# Patient Record
Sex: FEMALE | Race: WHITE | NOT HISPANIC OR LATINO | Employment: STUDENT | URBAN - METROPOLITAN AREA
[De-identification: names, ages, dates, MRNs, and addresses within clinical notes are randomized per-mention and may not be internally consistent; named-entity substitution may affect disease eponyms.]

---
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
## 2021-04-15 ENCOUNTER — PATIENT MESSAGE (OUTPATIENT)
Dept: RESEARCH | Facility: HOSPITAL | Age: 21
End: 2021-04-15

## 2021-06-07 RX ORDER — FLUOXETINE HYDROCHLORIDE 20 MG/1
CAPSULE ORAL
Qty: 30 CAPSULE | Refills: 0 | Status: SHIPPED | OUTPATIENT
Start: 2021-06-07 | End: 2021-07-12

## 2021-06-07 RX ORDER — FLUOXETINE HYDROCHLORIDE 40 MG/1
CAPSULE ORAL
Qty: 30 CAPSULE | Refills: 0 | Status: SHIPPED | OUTPATIENT
Start: 2021-06-07 | End: 2021-07-12 | Stop reason: SDUPTHER

## 2021-07-12 ENCOUNTER — OFFICE VISIT (OUTPATIENT)
Dept: PSYCHIATRY | Facility: CLINIC | Age: 21
End: 2021-07-12
Payer: MEDICAID

## 2021-07-12 DIAGNOSIS — F41.0 PANIC DISORDER WITHOUT AGORAPHOBIA WITH MODERATE PANIC ATTACKS: ICD-10-CM

## 2021-07-12 DIAGNOSIS — F41.1 GAD (GENERALIZED ANXIETY DISORDER): ICD-10-CM

## 2021-07-12 DIAGNOSIS — F40.10 SOCIAL ANXIETY DISORDER: ICD-10-CM

## 2021-07-12 DIAGNOSIS — F33.41 RECURRENT MAJOR DEPRESSION IN PARTIAL REMISSION: Primary | ICD-10-CM

## 2021-07-12 PROCEDURE — 99213 PR OFFICE/OUTPT VISIT, EST, LEVL III, 20-29 MIN: ICD-10-PCS | Mod: 95,AF,HA, | Performed by: PSYCHIATRY & NEUROLOGY

## 2021-07-12 PROCEDURE — 99213 OFFICE O/P EST LOW 20 MIN: CPT | Mod: 95,AF,HA, | Performed by: PSYCHIATRY & NEUROLOGY

## 2021-07-12 RX ORDER — FLUOXETINE HYDROCHLORIDE 40 MG/1
40 CAPSULE ORAL DAILY
Qty: 90 CAPSULE | Refills: 0 | Status: SHIPPED | OUTPATIENT
Start: 2021-07-12 | End: 2021-10-11 | Stop reason: SDUPTHER

## 2021-07-12 RX ORDER — FLUOXETINE HYDROCHLORIDE 20 MG/1
CAPSULE ORAL
Qty: 90 CAPSULE | Refills: 0 | Status: CANCELLED | OUTPATIENT
Start: 2021-07-12

## 2021-09-24 ENCOUNTER — OFFICE VISIT (OUTPATIENT)
Dept: PSYCHIATRY | Facility: CLINIC | Age: 21
End: 2021-09-24
Payer: MEDICAID

## 2021-09-24 DIAGNOSIS — F33.41 RECURRENT MAJOR DEPRESSION IN PARTIAL REMISSION: Primary | ICD-10-CM

## 2021-09-24 DIAGNOSIS — F40.10 SOCIAL ANXIETY DISORDER: ICD-10-CM

## 2021-09-24 DIAGNOSIS — F41.1 GAD (GENERALIZED ANXIETY DISORDER): ICD-10-CM

## 2021-09-24 DIAGNOSIS — F41.0 PANIC DISORDER WITHOUT AGORAPHOBIA WITH MODERATE PANIC ATTACKS: ICD-10-CM

## 2021-09-24 DIAGNOSIS — T88.7XXA SIDE EFFECT OF MEDICATION: ICD-10-CM

## 2021-09-24 PROCEDURE — 99214 OFFICE O/P EST MOD 30 MIN: CPT | Mod: 95,AF,HA, | Performed by: PSYCHIATRY & NEUROLOGY

## 2021-09-24 PROCEDURE — 99214 PR OFFICE/OUTPT VISIT, EST, LEVL IV, 30-39 MIN: ICD-10-PCS | Mod: 95,AF,HA, | Performed by: PSYCHIATRY & NEUROLOGY

## 2021-09-24 RX ORDER — FLUOXETINE HYDROCHLORIDE 40 MG/1
40 CAPSULE ORAL DAILY
Qty: 90 CAPSULE | Refills: 0 | Status: CANCELLED | OUTPATIENT
Start: 2021-09-24 | End: 2021-12-23

## 2021-09-24 RX ORDER — FLUOXETINE HYDROCHLORIDE 20 MG/1
20 CAPSULE ORAL DAILY
Qty: 30 CAPSULE | Refills: 1 | Status: SHIPPED | OUTPATIENT
Start: 2021-09-24 | End: 2021-11-23

## 2022-02-25 NOTE — PROGRESS NOTES
"Outpatient Psychiatry Follow-Up Visit (MD/NP)    4/23/2019    Clinical Status of Patient:  Outpatient (Ambulatory)  IDENTIFYING DATA:  Child's Name: Patricia Guzman  Grade:12 th will be in academic year 2018-19  School: [employer]  Lives with: father, 1 brother who recently graduated from college      Chief Complaint: Patricia Guzman is a 19 y.o. female who presents today for follow-up of depression and anxiety. Met with patient and her mother, Ira Guzman.    "I have only 4 days left of classes. I have no exams to take."    Interval History and Content of Current Session:  Interim Events/Subjective Report/Content of Current Session:     Mraina is a former patient of Dr. Mota and per chart review she is treated for anxiety, depression and panic attacks and has been non compliant with appointments and medications due to financial issues and not having health insurance. She was last seen 7/9/2018 by Dr. Mota. She presented to me on 12/12/2018 and was lost to follow up until today 4/23/2019.    "I am going [employer] and I will graduate with no dept. 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  I just have not sat down for the class."    "My parents are back together it has been OK but not really and I will be out in a few months."    Medical history: abdominal migraines    No surgical history    Current medications:    Vistaril  OCP  Hyoscyamine    Psychotherapy:  · Target symptoms: depression, anxiety   · Why chosen therapy is appropriate versus another modality: relevant to " diagnosis, patient responds to this modality, evidence based practice  · Outcome monitoring methods: self-report, observation  · Therapeutic intervention type: insight oriented psychotherapy, behavior modifying psychotherapy, supportive psychotherapy  · Topics discussed/themes: relationships difficulties, work stress, difficulty managing affect in interpersonal relationships, building skills sets for symptom management  · The patient's response to the intervention is accepting. The patient's progress toward treatment goals is good.   · Duration of intervention: 16 minutes.    Review of Systems   · PSYCHIATRIC: Pertinent items are noted in the narrative.  · CONSTITUTIONAL: No weight gain or loss.   · MUSCULOSKELETAL: No pain or stiffness of the joints.  · NEUROLOGIC: No weakness, sensory changes, seizures, confusion, memory loss, tremor or other abnormal movements.  · CARDIOVASCULAR: No tachycardia or chest pain.  · GASTROINTESTINAL:  No nausea, vomiting, pain, constipation or diarrhea. Patient has a history of abdominal migraines at least once per day.     Past Medical, Family and Social History: The patient's past medical, family and social history have been reviewed and updated as appropriate within the electronic medical record - see encounter notes. Parents recently  and they will be moving out of the family home.     Compliance: yes     Side effects: None     Risk Parameters:  Patient reports no suicidal ideation  Patient reports no homicidal ideation  Patient reports no self-injurious behavior  Patient reports no violent behavior    Wt Readings from Last 3 Encounters:   04/23/19 55.7 kg (122 lb 12.7 oz) (48 %, Z= -0.06)*   12/12/18 54.4 kg (119 lb 14.9 oz) (43 %, Z= -0.17)*   10/13/18 55.3 kg (122 lb) (48 %, Z= -0.04)*     * Growth percentiles are based on CDC (Girls, 2-20 Years) data.     Temp Readings from Last 3 Encounters:   10/13/18 97.9 °F (36.6 °C) (Oral)   05/03/18 98.3 °F (36.8 °C)   11/02/16  97.6 °F (36.4 °C) (Tympanic)     BP Readings from Last 3 Encounters:   04/23/19 114/64   12/12/18 110/67 (52 %, Z = 0.05 /  61 %, Z = 0.28)*   10/13/18 115/60 (67 %, Z = 0.45 /  24 %, Z = -0.72)*     *BP percentiles are based on the August 2017 AAP Clinical Practice Guideline for girls     Pulse Readings from Last 3 Encounters:   04/23/19 87   12/12/18 78   10/13/18 78     Weight is stable        Exam (detailed: at least 9 elements; comprehensive: all 15 elements)   Constitutional  Vitals:  Most recent vital signs, dated today, were reviewed.        General:  unremarkable, age appropriate, casually dressed, pleasant and cooperative and funny and easy to engage     Musculoskeletal  Muscle Strength/Tone:  no dyskinesia, no tremor, no tic   Gait & Station:  non-ataxic      Psychiatric  Speech:  no latency; no press, spontaneous   Mood & Affect:  euthymic  congruent and appropriate   Thought Process:  normal and logical   Associations:  intact   Thought Content:  normal, no suicidality, no homicidality, delusions, or paranoia   Insight:  intact   Judgement: behavior is adequate to circumstances   Orientation:  Alert and oriented to person place time date and situation   Memory: intact for content of interview   Language: Able to name and repeat   Attention Span & Concentration:  able to focus, completed tasks   Fund of Knowledge:  intact and appropriate to age and level of education      Assessment and Diagnosis   Status/Progress: Based on the examination today, the patient's problem(s) is/are inadequately controlled due to non compliance with appointments as they have no health insurance. New problems have not been presented today.  Diagnostic uncertainty and Lack of compliance are not complicating management of the primary condition. There are no active rule-out diagnoses for this patient at this time.      General Impression: 19 year old female with anxiety and depression improved significantly on Prozac 60 mg  daily.       ICD-10-CM ICD-9-CM   1. Major depressive disorder, recurrent episode, moderate F33.1 296.32   2. SANDHYA (generalized anxiety disorder) F41.1 300.02   3. Social anxiety disorder F40.10 300.23   4. Panic disorder without agoraphobia with moderate panic attacks F41.0 300.01       Intervention/Counseling/Treatment Plan   · Medication Management: Continue Prozac to 60 mg daily. The risks and benefits of medication were discussed with the patient.  · Counseling provided with patient and caregiver as follows: importance of compliance with chosen treatment options was emphasized, risks and benefits of treatment options, including medications, were discussed with the patient    Return to Clinic: 3 months     n/v , decreased PO intake

## 2022-03-18 DIAGNOSIS — F33.41 RECURRENT MAJOR DEPRESSION IN PARTIAL REMISSION: ICD-10-CM

## 2022-03-18 RX ORDER — FLUOXETINE HYDROCHLORIDE 40 MG/1
40 CAPSULE ORAL DAILY
Qty: 90 CAPSULE | Refills: 0 | Status: SHIPPED | OUTPATIENT
Start: 2022-03-18 | End: 2022-04-26 | Stop reason: SDUPTHER
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
## 2022-04-26 ENCOUNTER — OFFICE VISIT (OUTPATIENT)
Dept: PSYCHIATRY | Facility: CLINIC | Age: 22
End: 2022-04-26
Payer: MEDICAID

## 2022-04-26 DIAGNOSIS — F41.1 GAD (GENERALIZED ANXIETY DISORDER): Primary | ICD-10-CM

## 2022-04-26 DIAGNOSIS — F41.0 PANIC DISORDER WITHOUT AGORAPHOBIA WITH MODERATE PANIC ATTACKS: ICD-10-CM

## 2022-04-26 DIAGNOSIS — T88.7XXA SIDE EFFECT OF MEDICATION: ICD-10-CM

## 2022-04-26 DIAGNOSIS — F40.10 SOCIAL ANXIETY DISORDER: ICD-10-CM

## 2022-04-26 DIAGNOSIS — F33.41 RECURRENT MAJOR DEPRESSION IN PARTIAL REMISSION: ICD-10-CM

## 2022-04-26 PROCEDURE — 1160F RVW MEDS BY RX/DR IN RCRD: CPT | Mod: AF,HB,CPTII,95 | Performed by: PSYCHIATRY & NEUROLOGY

## 2022-04-26 PROCEDURE — 1159F PR MEDICATION LIST DOCUMENTED IN MEDICAL RECORD: ICD-10-PCS | Mod: AF,HB,CPTII,95 | Performed by: PSYCHIATRY & NEUROLOGY

## 2022-04-26 PROCEDURE — 1160F PR REVIEW ALL MEDS BY PRESCRIBER/CLIN PHARMACIST DOCUMENTED: ICD-10-PCS | Mod: AF,HB,CPTII,95 | Performed by: PSYCHIATRY & NEUROLOGY

## 2022-04-26 PROCEDURE — 1159F MED LIST DOCD IN RCRD: CPT | Mod: AF,HB,CPTII,95 | Performed by: PSYCHIATRY & NEUROLOGY

## 2022-04-26 PROCEDURE — 99214 OFFICE O/P EST MOD 30 MIN: CPT | Mod: AF,HB,95, | Performed by: PSYCHIATRY & NEUROLOGY

## 2022-04-26 PROCEDURE — 99214 PR OFFICE/OUTPT VISIT, EST, LEVL IV, 30-39 MIN: ICD-10-PCS | Mod: AF,HB,95, | Performed by: PSYCHIATRY & NEUROLOGY

## 2022-04-26 RX ORDER — FLUOXETINE HYDROCHLORIDE 40 MG/1
40 CAPSULE ORAL DAILY
Qty: 90 CAPSULE | Refills: 0 | Status: SHIPPED | OUTPATIENT
Start: 2022-04-26 | End: 2022-07-26 | Stop reason: SDUPTHER

## 2022-04-26 RX ORDER — BUPROPION HYDROCHLORIDE 150 MG/1
150 TABLET ORAL DAILY
Qty: 90 TABLET | Refills: 0 | Status: SHIPPED | OUTPATIENT
Start: 2022-04-26 | End: 2022-09-06

## 2022-04-27 DIAGNOSIS — G43.009 MIGRAINE WITHOUT AURA AND WITHOUT STATUS MIGRAINOSUS, NOT INTRACTABLE: Primary | ICD-10-CM

## 2022-06-19 ENCOUNTER — PATIENT MESSAGE (OUTPATIENT)
Dept: PSYCHIATRY | Facility: CLINIC | Age: 22
End: 2022-06-19
Payer: MEDICAID

## 2022-07-05 ENCOUNTER — PATIENT MESSAGE (OUTPATIENT)
Dept: PSYCHIATRY | Facility: CLINIC | Age: 22
End: 2022-07-05
Payer: MEDICAID

## 2022-07-21 ENCOUNTER — PATIENT MESSAGE (OUTPATIENT)
Dept: PSYCHIATRY | Facility: CLINIC | Age: 22
End: 2022-07-21
Payer: MEDICAID

## 2022-07-26 DIAGNOSIS — F33.41 RECURRENT MAJOR DEPRESSION IN PARTIAL REMISSION: ICD-10-CM

## 2022-07-26 RX ORDER — FLUOXETINE HYDROCHLORIDE 40 MG/1
40 CAPSULE ORAL DAILY
Qty: 90 CAPSULE | Refills: 0 | Status: SHIPPED | OUTPATIENT
Start: 2022-07-26 | End: 2022-08-23 | Stop reason: SDUPTHER

## 2022-08-23 ENCOUNTER — OFFICE VISIT (OUTPATIENT)
Dept: PSYCHIATRY | Facility: CLINIC | Age: 22
End: 2022-08-23
Payer: MEDICAID

## 2022-08-23 DIAGNOSIS — F33.41 RECURRENT MAJOR DEPRESSION IN PARTIAL REMISSION: Primary | ICD-10-CM

## 2022-08-23 DIAGNOSIS — F40.10 SOCIAL ANXIETY DISORDER: ICD-10-CM

## 2022-08-23 DIAGNOSIS — F41.1 GAD (GENERALIZED ANXIETY DISORDER): ICD-10-CM

## 2022-08-23 DIAGNOSIS — F41.0 PANIC DISORDER WITHOUT AGORAPHOBIA WITH MODERATE PANIC ATTACKS: ICD-10-CM

## 2022-08-23 DIAGNOSIS — T88.7XXA SIDE EFFECT OF MEDICATION: ICD-10-CM

## 2022-08-23 PROCEDURE — 1160F PR REVIEW ALL MEDS BY PRESCRIBER/CLIN PHARMACIST DOCUMENTED: ICD-10-PCS | Mod: CPTII,95,AF,HB | Performed by: PSYCHIATRY & NEUROLOGY

## 2022-08-23 PROCEDURE — 1160F RVW MEDS BY RX/DR IN RCRD: CPT | Mod: CPTII,95,AF,HB | Performed by: PSYCHIATRY & NEUROLOGY

## 2022-08-23 PROCEDURE — 99214 PR OFFICE/OUTPT VISIT, EST, LEVL IV, 30-39 MIN: ICD-10-PCS | Mod: 95,AF,HB, | Performed by: PSYCHIATRY & NEUROLOGY

## 2022-08-23 PROCEDURE — 99214 OFFICE O/P EST MOD 30 MIN: CPT | Mod: 95,AF,HB, | Performed by: PSYCHIATRY & NEUROLOGY

## 2022-08-23 PROCEDURE — 1159F PR MEDICATION LIST DOCUMENTED IN MEDICAL RECORD: ICD-10-PCS | Mod: CPTII,95,AF,HB | Performed by: PSYCHIATRY & NEUROLOGY

## 2022-08-23 PROCEDURE — 1159F MED LIST DOCD IN RCRD: CPT | Mod: CPTII,95,AF,HB | Performed by: PSYCHIATRY & NEUROLOGY

## 2022-08-23 RX ORDER — BUPROPION HYDROCHLORIDE 150 MG/1
150 TABLET ORAL DAILY
Qty: 90 TABLET | Refills: 0 | Status: CANCELLED | OUTPATIENT
Start: 2022-08-23 | End: 2022-11-21

## 2022-08-23 RX ORDER — FLUOXETINE HYDROCHLORIDE 40 MG/1
40 CAPSULE ORAL DAILY
Qty: 90 CAPSULE | Refills: 0 | Status: SHIPPED | OUTPATIENT
Start: 2022-08-23 | End: 2023-01-12
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
## 2022-09-02 RX ORDER — SUMATRIPTAN 50 MG/1
TABLET, FILM COATED ORAL
COMMUNITY
Start: 2022-07-28 | End: 2022-09-06 | Stop reason: SDUPTHER

## 2022-09-02 NOTE — PROGRESS NOTES
[facility] Neurology initial Office Visit Note    Initial Visit Date:  September 6, 2022  Current Visit Date:  09/06/2022    Chief Complaint:     Chief Complaint   Patient presents with    Headache     States migraines are daily       History of Present Illness:      This is 22 y.o. female with history of depression, who is referred headache disorder.    Age of Onset : elementary school    Headache Description: left retro-orbital sharp pain, severe, lasting 3 days, with nausea, with photophobia and phonophobia, with OU with loss of vision, impeding day to day activity.     Frequency: 20 migraine headache days per month for the past 2 years     Provocation Factors: stress, sleep deprivation     Risk Factors  - Family history of headache disorder: Yes mom with migraine disorder .  - History of focal CNS lesions: No  - History of CNS infections: No  - History head trauma: No  - History of underlying mood disorder: Yes depression, anxiety.   - History of sleep disorder: No  - Recreational drug use: No  - Tobacco use: No  - Alcohol use: Yes occasional.   - Weight fluctuation: No  - Isotretinoin or Tetracycline use:  No  - Family planning and contraceptive use: No. Not on contraceptives.     Medications:     Current Prophylactic  Fluoxetine 40 mg once a day     Current Abortive  Sumatriptan 50 mg twice a day as needed - partially effective   Aleve - < 1 per week. Ineffecitve     Prior Prophylactic  Propanolol 10 mg (February 21, 2020) - worsening headache   Wellbutrin - too expensive.     Prior Abortive  Denied     Devices:     - VNS:  - TNS  - TMS:     Procedures:     - Botox:  - PSG block:   - Occipital nerve block:     Labs:     Results for orders placed or performed during the hospital encounter of 10/13/18   POCT urine pregnancy   Result Value Ref Range    POC Preg Test, Ur Negative Negative     Acceptable Yes        Studies:     - MRI Brain:   - MRA Head w/o Tramaine:   - MRV Head w/o Tramaine:   - NCHCT:  - Lumbar  Puncture:    Review of Systems:     Review of Systems   All other systems reviewed and are negative.    Physical Exams:     Vitals:    09/06/22 0856   BP: 116/77   Pulse: 96   Resp: 18   Temp: 97.9 °F (36.6 °C)       Physical Exam  Vitals and nursing note reviewed.   Constitutional:       Appearance: Normal appearance.   HENT:      Head: Normocephalic and atraumatic.      Nose: Nose normal.      Mouth/Throat:      Mouth: Mucous membranes are moist.      Pharynx: Oropharynx is clear.   Eyes:      Conjunctiva/sclera: Conjunctivae normal.   Cardiovascular:      Rate and Rhythm: Normal rate and regular rhythm.      Pulses: Normal pulses.   Pulmonary:      Effort: Pulmonary effort is normal.      Breath sounds: Normal breath sounds.   Abdominal:      General: Abdomen is flat.   Musculoskeletal:         General: Normal range of motion.      Cervical back: Normal range of motion.   Skin:     General: Skin is warm.   Neurological:      Mental Status: She is alert.       Comprehensive Neurological Exam:  Mental Status: Alert Oriented to Self, Date, and Place. Naming, repetition, reading, and writing wnl. Comprehension wnl. No dysarthria.   CN II - XII: GUERO, No APD, Fundus wnl OU, VA grossly intact to finger counting at 6 ft, VFFC, No ptosis OU, EOMI without nystagmus LT/Temp symmetric in CN V1-3 distribution, Hearing grossly intact, Face Symmetric, Tongue and Uvula midline, Trapezius symmetric bilateral.   Motor: tone and bulk wnl throughout, no abnormal involuntary or voluntary movements, 5/5 to confrontation, Fine finger movements wnl b/l, No pronator drift.   Sensory: LT, Proprioception, Vibration, PP, Temp symmetric. No sensory simultagnosia.   Reflexes: 2+ throughout, plantar reflexes downward bilateral.   Cerebellar: FNF wnl b/l, RAHM wnl b/l  Romberg: Negative  Gait: normal. Heel Gait, Toe Gait, Tandem Gait wnl.     Assessment:     This is 22 y.o. female with history of depression, who is referred for chronic  migraine without aura, not intractable. Patient not intending on any form of family planning at this point in time. She is made aware of the teratogenicity of Topiramate.     Problem List Items Addressed This Visit          Psychiatric    Depression, major, recurrent, in partial remission     Other Visit Diagnoses       Migraine without aura and without status migrainosus, not intractable    -  Primary            Plan:     [] increase Sumatriptan to 100 mg twice a day as needed   [] start TPM 50 mg at bedtime    RTC 3 months    Headache education provided: good sleep hygiene and 7 hours of sleep per night, stress management, medication overuse education provided. Using more 3 OTC per week may worsen headaches, high intensity interval training has shown to reduce headache frequency. Low carb, high protein has shown to reduce headache frequency. Patient is instructed in keep headache diary.     I have explained the treatment plan, diagnosis, and prognosis to patient. All questions are answered to the best of my knowledge.     Face to face time 45 minutes, including documentation, chart review, counseling, education, review of test results, relevant medical records, and coordination of care.       Candace Jacobo MD   General Neurology  09/06/2022

## 2022-09-06 ENCOUNTER — OFFICE VISIT (OUTPATIENT)
Dept: NEUROLOGY | Facility: CLINIC | Age: 22
End: 2022-09-06
Payer: MEDICAID

## 2022-09-06 VITALS
WEIGHT: 149.94 LBS | TEMPERATURE: 98 F | RESPIRATION RATE: 18 BRPM | OXYGEN SATURATION: 99 % | HEIGHT: 62 IN | DIASTOLIC BLOOD PRESSURE: 77 MMHG | BODY MASS INDEX: 27.59 KG/M2 | HEART RATE: 96 BPM | SYSTOLIC BLOOD PRESSURE: 116 MMHG

## 2022-09-06 DIAGNOSIS — F33.41 DEPRESSION, MAJOR, RECURRENT, IN PARTIAL REMISSION: ICD-10-CM

## 2022-09-06 DIAGNOSIS — G43.001 MIGRAINE WITHOUT AURA AND WITH STATUS MIGRAINOSUS, NOT INTRACTABLE: Primary | ICD-10-CM

## 2022-09-06 PROBLEM — G43.009 MIGRAINE WITHOUT AURA AND WITHOUT STATUS MIGRAINOSUS, NOT INTRACTABLE: Status: ACTIVE | Noted: 2022-09-06

## 2022-09-06 PROCEDURE — 1159F PR MEDICATION LIST DOCUMENTED IN MEDICAL RECORD: ICD-10-PCS | Mod: CPTII,,, | Performed by: PSYCHIATRY & NEUROLOGY

## 2022-09-06 PROCEDURE — 3074F SYST BP LT 130 MM HG: CPT | Mod: CPTII,,, | Performed by: PSYCHIATRY & NEUROLOGY

## 2022-09-06 PROCEDURE — 3078F DIAST BP <80 MM HG: CPT | Mod: CPTII,,, | Performed by: PSYCHIATRY & NEUROLOGY

## 2022-09-06 PROCEDURE — 3008F BODY MASS INDEX DOCD: CPT | Mod: CPTII,,, | Performed by: PSYCHIATRY & NEUROLOGY

## 2022-09-06 PROCEDURE — 99214 OFFICE O/P EST MOD 30 MIN: CPT | Mod: PBBFAC | Performed by: PSYCHIATRY & NEUROLOGY

## 2022-09-06 PROCEDURE — 1159F MED LIST DOCD IN RCRD: CPT | Mod: CPTII,,, | Performed by: PSYCHIATRY & NEUROLOGY

## 2022-09-06 PROCEDURE — 3074F PR MOST RECENT SYSTOLIC BLOOD PRESSURE < 130 MM HG: ICD-10-PCS | Mod: CPTII,,, | Performed by: PSYCHIATRY & NEUROLOGY

## 2022-09-06 PROCEDURE — 99204 OFFICE O/P NEW MOD 45 MIN: CPT | Mod: S$PBB,,, | Performed by: PSYCHIATRY & NEUROLOGY

## 2022-09-06 PROCEDURE — 3008F PR BODY MASS INDEX (BMI) DOCUMENTED: ICD-10-PCS | Mod: CPTII,,, | Performed by: PSYCHIATRY & NEUROLOGY

## 2022-09-06 PROCEDURE — 3078F PR MOST RECENT DIASTOLIC BLOOD PRESSURE < 80 MM HG: ICD-10-PCS | Mod: CPTII,,, | Performed by: PSYCHIATRY & NEUROLOGY

## 2022-09-06 PROCEDURE — 99204 PR OFFICE/OUTPT VISIT, NEW, LEVL IV, 45-59 MIN: ICD-10-PCS | Mod: S$PBB,,, | Performed by: PSYCHIATRY & NEUROLOGY

## 2022-09-06 RX ORDER — ONDANSETRON 8 MG/1
8 TABLET, ORALLY DISINTEGRATING ORAL 2 TIMES DAILY
COMMUNITY
End: 2022-12-07 | Stop reason: SDUPTHER

## 2022-09-06 RX ORDER — SUMATRIPTAN SUCCINATE 100 MG/1
100 TABLET ORAL 2 TIMES DAILY PRN
Qty: 9 TABLET | Refills: 4 | Status: SHIPPED | OUTPATIENT
Start: 2022-09-06 | End: 2022-12-07 | Stop reason: SDUPTHER

## 2022-09-06 RX ORDER — TOPIRAMATE 50 MG/1
50 TABLET, FILM COATED ORAL NIGHTLY
Qty: 30 TABLET | Refills: 4 | Status: SHIPPED | OUTPATIENT
Start: 2022-09-06 | End: 2022-12-05

## 2022-10-30 NOTE — PROGRESS NOTES
"Outpatient Psychiatry Follow-Up Visit (MD/NP)    10/31/2022    Last appointment:8/23/2022    Missed appointment:  June 21, 2021 July 2, 2021    Clinical Status of Patient:  Outpatient (Ambulatory)    IDENTIFYING DATA:    Child's Name: Patricia Dillon"  Grade: 4th year in college 2022-23 and will graduate after 2023-24 academic year majoring in health information management  School: [employer]  Lives with:(now in an apartment)     The patient location is: [place]  The chief complaint leading to consultation is: anxiety, new problem of inattention    Visit type: audiovisual    Face to Face time with patient: 30 minutes  30 minutes of total time spent on the encounter, which includes face to face time and non-face to face time preparing to see the patient (e.g., review of tests), Obtaining and/or reviewing separately obtained history, Documenting clinical information in the electronic or other health record, Independently interpreting results (not separately reported) and communicating results to the patient/family/caregiver, or Care coordination (not separately reported).         Each patient to whom he or she provides medical services by telemedicine is:  (1) informed of the relationship between the physician and patient and the respective role of any other health care provider with respect to management of the patient; and (2) notified that he or she may decline to receive medical services by telemedicine and may withdraw from such care at any time.    Notes:       Chief Complaint: Patricia Guzman is a 22 year old female who presents today for follow-up medication management of depression and anxiety. Met with patient.    "I am not doing well but I am in a downswing. It started a week ago. I could see that I getting more stressed out about school. It is just a lot and projects and tests on top of tests."    Interval History and Content of Current Session:  Interim Events/Subjective Report/Content of Current " "Session:     "I can't drop anything because it has to go in order."    "I am taking 18 credit hours because I have switched majors."    "It is going to get better. I am so unmotivated. I am having a hard time."    "I am trying to actively do things and I am making sure I am on top of my studies."    "I took a test today. I am exercising and hanging out with my friends and I really trying."    "I am putting in effort to take care of myself."    "Last week I took a mental health day."    "I am still working and I have to go to work."    "I am trying to save up."    "I am working toward a pair of shoes."    "I will have to look in planner and see if I can have a mental health day."    "I really have been doing fine. I think it is situational. I am trying to find a therapist."    "I work 10-12 hours per week and I take 18 credit hours."    Discussed Tr[employer].    No SI  No cutting  Medical history: abdominal migraines    No surgical history  Current medications:    Vistaril  OCP-discontinued  Hyoscyamine-discontinued    Psychotherapy:  Target symptoms: anxiety , adjustment, difficulty with school  Why chosen therapy is appropriate versus another modality: relevant to diagnosis, patient responds to this modality, evidence based practice  Outcome monitoring methods: self-report, observation  Therapeutic intervention type: insight oriented psychotherapy, behavior modifying psychotherapy, supportive psychotherapy  Topics discussed/themes: relationships difficulties, difficulty managing affect in interpersonal relationships, building skills sets for symptom management, symptom recognition  The patient's response to the intervention is accepting. The patient's progress toward treatment goals is good.   Duration of intervention: 0 minutes.    Review of Systems   PSYCHIATRIC: Pertinent items are noted in the narrative.  CONSTITUTIONAL: No weight gain or loss.   MUSCULOSKELETAL: No pain or stiffness of the joints.  NEUROLOGIC: " "No weakness, sensory changes, seizures, confusion, memory loss, tremor or other abnormal movements.  CARDIOVASCULAR: No tachycardia or chest pain.  GASTROINTESTINAL:  No nausea, vomiting, pain, constipation or diarrhea. Patient has a history of abdominal migraines at least once per day.     Past Medical, Family and Social History: The patient's past medical, family and social history have been reviewed and updated as appropriate within the electronic medical record - see encounter notes. Parents are . Completed sophomore majoring in pre-nursing at [employer]. Not doing well in the program and taking biology online this summer. 2020-21 academic year has been more of a challenge. 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     Compliance: yes     Side effects: None     Risk Parameters:  Patient reports no suicidal ideation  Patient reports no homicidal ideation  Patient reports no self-injurious behavior  Patient reports no violent behavior      Exam (detailed: at least 9 elements; comprehensive: all 15 elements)   Constitutional  Vitals:  No vitals taken today     General:  unremarkable, age appropriate, casually dressed, pleasant and cooperative and funny and easy to engage     Musculoskeletal  Muscle Strength/Tone:  no dyskinesia, no tremor, no tic   Gait & Station:  non-ataxic      Psychiatric  Speech:  no latency; no press, spontaneous   Mood & Affect:  Euthymic "OK"  congruent and appropriate   Thought Process:  normal and logical   Associations:  intact   Thought Content:  normal, no suicidality, no homicidality, delusions, or paranoia   Insight:  intact   Judgement: behavior is adequate to circumstances   Orientation:  Alert and oriented to person place time date and situation   Memory: intact for content of interview   Language: Able to name and repeat   Attention Span & Concentration:  able to focus, completed tasks   Fund of " Knowledge:  intact and appropriate to age and level of education      Assessment and Diagnosis   Status/Progress: Based on the examination today, the patient's problem(s) is/are adequately controlled.New problems have not been presented today.  Diagnostic uncertainty and Lack of compliance are not complicating management of the primary condition. There are no active rule-out diagnoses for this patient at this time.      General Impression: 22 year old female with anxiety and depression improved significantly on Prozac daily. New complaint of decreased libido was improved by lowering Prozac but her anxiety/panic returned.     ICD-10-CM ICD-9-CM   1. Recurrent Major Depression in Partial Remission F33.41    2. SANDHYA (generalized anxiety disorder) F41.1 300.02   3. Social anxiety disorder F40.10 300.23   4. Panic disorder without agoraphobia with moderate panic attacks F41.0 300.01   5.      Side effect of a medication  R/O ADHD inattentive type  Intervention/Counseling/Treatment Plan   Medication Management: Prozac to 40 mg daily. The risks and benefits of medication were discussed with the patient.  Wellbutrin  mg daily to see if it can restore her libido but insurance denied it    Consider Trintellix-would need PA and she has only ever been on a single SSRI-Prozac    Counseling provided with patient and caregiver as follows: importance of compliance with chosen treatment options was emphasized, risks and benefits of treatment options, including medications, were discussed with the patient      Return to Clinic: 3 months

## 2022-10-31 RX ORDER — FLUOXETINE HYDROCHLORIDE 40 MG/1
40 CAPSULE ORAL DAILY
Qty: 90 CAPSULE | Refills: 0 | Status: CANCELLED | OUTPATIENT
Start: 2022-10-31 | End: 2023-01-29

## 2022-11-01 ENCOUNTER — OFFICE VISIT (OUTPATIENT)
Dept: PSYCHIATRY | Facility: CLINIC | Age: 22
End: 2022-11-01
Payer: MEDICAID

## 2022-11-01 DIAGNOSIS — F33.41 RECURRENT MAJOR DEPRESSION IN PARTIAL REMISSION: Primary | ICD-10-CM

## 2022-11-01 DIAGNOSIS — F41.0 PANIC DISORDER WITHOUT AGORAPHOBIA WITH MODERATE PANIC ATTACKS: ICD-10-CM

## 2022-11-01 DIAGNOSIS — T88.7XXA SIDE EFFECT OF MEDICATION: ICD-10-CM

## 2022-11-01 DIAGNOSIS — F40.10 SOCIAL ANXIETY DISORDER: ICD-10-CM

## 2022-11-01 DIAGNOSIS — F41.1 GAD (GENERALIZED ANXIETY DISORDER): ICD-10-CM

## 2022-11-01 PROCEDURE — 1159F MED LIST DOCD IN RCRD: CPT | Mod: AF,HB,95,CPTII | Performed by: PSYCHIATRY & NEUROLOGY

## 2022-11-01 PROCEDURE — 1159F PR MEDICATION LIST DOCUMENTED IN MEDICAL RECORD: ICD-10-PCS | Mod: AF,HB,95,CPTII | Performed by: PSYCHIATRY & NEUROLOGY

## 2022-11-01 PROCEDURE — 99999 PR PBB SHADOW E&M-EST. PATIENT-LVL II: ICD-10-PCS | Mod: PBBFAC,,, | Performed by: PSYCHIATRY & NEUROLOGY

## 2022-11-01 PROCEDURE — 99214 OFFICE O/P EST MOD 30 MIN: CPT | Mod: AF,HB,95,S$PBB | Performed by: PSYCHIATRY & NEUROLOGY

## 2022-11-01 PROCEDURE — 99212 OFFICE O/P EST SF 10 MIN: CPT | Mod: PBBFAC | Performed by: PSYCHIATRY & NEUROLOGY

## 2022-11-01 PROCEDURE — 99214 PR OFFICE/OUTPT VISIT, EST, LEVL IV, 30-39 MIN: ICD-10-PCS | Mod: AF,HB,95,S$PBB | Performed by: PSYCHIATRY & NEUROLOGY

## 2022-11-01 PROCEDURE — 99999 PR PBB SHADOW E&M-EST. PATIENT-LVL II: CPT | Mod: PBBFAC,,, | Performed by: PSYCHIATRY & NEUROLOGY

## 2022-11-01 PROCEDURE — 1160F RVW MEDS BY RX/DR IN RCRD: CPT | Mod: AF,HB,95,CPTII | Performed by: PSYCHIATRY & NEUROLOGY

## 2022-11-01 PROCEDURE — 1160F PR REVIEW ALL MEDS BY PRESCRIBER/CLIN PHARMACIST DOCUMENTED: ICD-10-PCS | Mod: AF,HB,95,CPTII | Performed by: PSYCHIATRY & NEUROLOGY

## 2022-12-04 DIAGNOSIS — G43.001 MIGRAINE WITHOUT AURA AND WITH STATUS MIGRAINOSUS, NOT INTRACTABLE: ICD-10-CM

## 2022-12-04 DIAGNOSIS — F33.41 DEPRESSION, MAJOR, RECURRENT, IN PARTIAL REMISSION: ICD-10-CM

## 2022-12-05 RX ORDER — TOPIRAMATE 50 MG/1
TABLET, FILM COATED ORAL
Qty: 90 TABLET | Refills: 1 | Status: SHIPPED | OUTPATIENT
Start: 2022-12-05 | End: 2022-12-07 | Stop reason: SDUPTHER

## 2022-12-06 NOTE — PROGRESS NOTES
[facility] Neurology Follow Up Office Visit Note    Initial Visit Date: 9/6/2022  Last Visit Date: 9/6/2022  Current Visit Date:  12/07/2022    Chief Complaint:     Chief Complaint   Patient presents with    F/U migraines-stated topiramate helped, but had side effect    also had side effect--food tasting differently       History of Present Illness:      This is 22 y.o. female with history of depression, who is referred for chronic migraine without aura, not intractable.  During last visit, topiramate 50 mg once at bedtime was started.  Sumatriptan was increased to 100 mg twice a day as needed.      Age of Onset : elementary school     Headache Description: left retro-orbital sharp pain, severe, lasting 3 days, with nausea, with photophobia and phonophobia, with OU with loss of vision, impeding day to day activity.      Frequency: 4 migraine headache days per month     Provocation Factors: stress, sleep deprivation      Risk Factors  - Family history of headache disorder: Yes mom with migraine disorder .  - History of focal CNS lesions: No  - History of CNS infections: No  - History head trauma: No  - History of underlying mood disorder: Yes depression, anxiety.   - History of sleep disorder: No  - Recreational drug use: No  - Tobacco use: No  - Alcohol use: Yes occasional.   - Weight fluctuation: No  - Isotretinoin or Tetracycline use:  No  - Family planning and contraceptive use: No. Not on contraceptives.     Medications:     Current Prophylactic  Fluoxetine 40 mg once a day   Topiramate 50 mg once at bedtime (September 6, 2022 to present): changes in taste. Paresthesia.     Current Abortive  Sumatriptan 100 mg twice a day as needed (September 6, 2022 to present): effective after one.   Aleve - < 1 per week. Ineffective    Prior Prophylactic  Propanolol 10 mg (February 21, 2020) - worsening headache   Wellbutrin - too expensive.     Prior Abortive  Sumatriptan 50 mg twice a day as needed - partially effective      Devices:     - VNS:  - TNS  - TMS:     Procedures:     - Botox:  - PSG block:   - Occipital nerve block:     Labs:     Results for orders placed or performed during the hospital encounter of 10/13/18   POCT urine pregnancy   Result Value Ref Range    POC Preg Test, Ur Negative Negative     Acceptable Yes        Studies:     - MRI Brain:   - MRA Head w/o Tramaine:   - MRV Head w/o Tramaine:   - NCHCT:  - Lumbar Puncture:    Review of Systems:     All other systems reviewed and are negative.    Physical Exams:     Vitals:    12/07/22 0946   BP: 108/71   Pulse: 86   Resp: 14   Temp: 97.9 °F (36.6 °C)       Vitals and nursing note reviewed.   Constitutional:       Appearance: Normal appearance.   HENT:      Head: Normocephalic and atraumatic.      Nose: Nose normal.      Mouth/Throat:      Mouth: Mucous membranes are moist.      Pharynx: Oropharynx is clear.   Eyes:      Conjunctiva/sclera: Conjunctivae normal.   Cardiovascular:      Rate and Rhythm: Normal rate and regular rhythm.      Pulses: Normal pulses.   Pulmonary:      Effort: Pulmonary effort is normal.      Breath sounds: Normal breath sounds.   Abdominal:      General: Abdomen is flat.   Musculoskeletal:         General: Normal range of motion.      Cervical back: Normal range of motion.   Skin:     General: Skin is warm.   Neurological:      Mental Status: She is alert.         Comprehensive Neurological Exam:  Mental Status: Alert Oriented to Self, Date, and Place. Naming, repetition, reading, and writing wnl. Comprehension wnl. No dysarthria.   CN II - XII: GUERO, No APD, Fundus wnl OU, VA grossly intact to finger counting at 6 ft, VFFC, No ptosis OU, EOMI without nystagmus LT/Temp symmetric in CN V1-3 distribution, Hearing grossly intact, Face Symmetric, Tongue and Uvula midline, Trapezius symmetric bilateral.   Motor: tone and bulk wnl throughout, no abnormal involuntary or voluntary movements, 5/5 to confrontation, Fine finger movements wnl b/l,  No pronator drift.   Sensory: LT, Proprioception, Vibration, PP, Temp symmetric. No sensory simultagnosia.   Reflexes: 2+ throughout, plantar reflexes downward bilateral.   Cerebellar: FNF wnl b/l, RAHM wnl b/l  Romberg: Negative  Gait: normal. Heel Gait, Toe Gait, Tandem Gait wnl.     Assessment:     This is 22 y.o. female with history of depression, who is referred for chronic migraine without aura, not intractable. Patient not intending on any form of family planning at this point in time. She is made aware of the teratogenicity of Topiramate.     Problem List Items Addressed This Visit          Neuro    Migraine without aura and with status migrainosus, not intractable - Primary (Chronic)       Psychiatric    Depression, major, recurrent, in partial remission (Chronic)       Plan:     [] continue with Sumatriptan 100 mg twice a day as needed   [] continue with Topiramate 50 mg once at bedtime for 3 months. Will wean off of medication if headache continues to be well controlled at that point in time.     RTC 3 months with Telemed     Headache education provided: good sleep hygiene and 7 hours of sleep per night, stress management, medication overuse education provided. Using more 3 OTC per week may worsen headaches, high intensity interval training has shown to reduce headache frequency. Low carb, high protein has shown to reduce headache frequency. Patient is instructed in keep headache diary.     I have explained the treatment plan, diagnosis, and prognosis to patient. All questions are answered to the best of my knowledge.     Face to face time 30 minutes, including documentation, chart review, counseling, education, review of test results, relevant medical records, and coordination of care.       Candace Jacobo MD   General Neurology  12/07/2022

## 2022-12-07 ENCOUNTER — OFFICE VISIT (OUTPATIENT)
Dept: NEUROLOGY | Facility: CLINIC | Age: 22
End: 2022-12-07
Payer: MEDICAID

## 2022-12-07 VITALS
DIASTOLIC BLOOD PRESSURE: 71 MMHG | HEIGHT: 62 IN | WEIGHT: 148.19 LBS | SYSTOLIC BLOOD PRESSURE: 108 MMHG | TEMPERATURE: 98 F | OXYGEN SATURATION: 97 % | BODY MASS INDEX: 27.27 KG/M2 | RESPIRATION RATE: 14 BRPM | HEART RATE: 86 BPM

## 2022-12-07 DIAGNOSIS — G43.001 MIGRAINE WITHOUT AURA AND WITH STATUS MIGRAINOSUS, NOT INTRACTABLE: Primary | ICD-10-CM

## 2022-12-07 DIAGNOSIS — F33.41 DEPRESSION, MAJOR, RECURRENT, IN PARTIAL REMISSION: ICD-10-CM

## 2022-12-07 PROCEDURE — 99213 OFFICE O/P EST LOW 20 MIN: CPT | Mod: PBBFAC | Performed by: PSYCHIATRY & NEUROLOGY

## 2022-12-07 PROCEDURE — 99214 OFFICE O/P EST MOD 30 MIN: CPT | Mod: S$PBB,,, | Performed by: PSYCHIATRY & NEUROLOGY

## 2022-12-07 PROCEDURE — 3074F PR MOST RECENT SYSTOLIC BLOOD PRESSURE < 130 MM HG: ICD-10-PCS | Mod: CPTII,,, | Performed by: PSYCHIATRY & NEUROLOGY

## 2022-12-07 PROCEDURE — 3078F DIAST BP <80 MM HG: CPT | Mod: CPTII,,, | Performed by: PSYCHIATRY & NEUROLOGY

## 2022-12-07 PROCEDURE — 3074F SYST BP LT 130 MM HG: CPT | Mod: CPTII,,, | Performed by: PSYCHIATRY & NEUROLOGY

## 2022-12-07 PROCEDURE — 1159F MED LIST DOCD IN RCRD: CPT | Mod: CPTII,,, | Performed by: PSYCHIATRY & NEUROLOGY

## 2022-12-07 PROCEDURE — 3008F PR BODY MASS INDEX (BMI) DOCUMENTED: ICD-10-PCS | Mod: CPTII,,, | Performed by: PSYCHIATRY & NEUROLOGY

## 2022-12-07 PROCEDURE — 3008F BODY MASS INDEX DOCD: CPT | Mod: CPTII,,, | Performed by: PSYCHIATRY & NEUROLOGY

## 2022-12-07 PROCEDURE — 1159F PR MEDICATION LIST DOCUMENTED IN MEDICAL RECORD: ICD-10-PCS | Mod: CPTII,,, | Performed by: PSYCHIATRY & NEUROLOGY

## 2022-12-07 PROCEDURE — 3078F PR MOST RECENT DIASTOLIC BLOOD PRESSURE < 80 MM HG: ICD-10-PCS | Mod: CPTII,,, | Performed by: PSYCHIATRY & NEUROLOGY

## 2022-12-07 PROCEDURE — 99214 PR OFFICE/OUTPT VISIT, EST, LEVL IV, 30-39 MIN: ICD-10-PCS | Mod: S$PBB,,, | Performed by: PSYCHIATRY & NEUROLOGY

## 2022-12-07 RX ORDER — TOPIRAMATE 50 MG/1
50 TABLET, FILM COATED ORAL NIGHTLY
Qty: 30 TABLET | Refills: 2 | Status: SHIPPED | OUTPATIENT
Start: 2022-12-07 | End: 2023-03-07

## 2022-12-07 RX ORDER — ONDANSETRON 8 MG/1
8 TABLET, ORALLY DISINTEGRATING ORAL 2 TIMES DAILY
Qty: 20 TABLET | Refills: 4 | Status: SHIPPED | OUTPATIENT
Start: 2022-12-07 | End: 2023-01-06

## 2022-12-07 RX ORDER — SUMATRIPTAN SUCCINATE 100 MG/1
100 TABLET ORAL 2 TIMES DAILY PRN
Qty: 9 TABLET | Refills: 4 | Status: SHIPPED | OUTPATIENT
Start: 2022-12-07 | End: 2023-12-07 | Stop reason: SDUPTHER

## 2022-12-20 ENCOUNTER — OFFICE VISIT (OUTPATIENT)
Dept: URGENT CARE | Facility: CLINIC | Age: 22
End: 2022-12-20
Payer: MEDICAID

## 2022-12-20 VITALS
BODY MASS INDEX: 25.12 KG/M2 | TEMPERATURE: 98 F | SYSTOLIC BLOOD PRESSURE: 115 MMHG | OXYGEN SATURATION: 99 % | RESPIRATION RATE: 18 BRPM | DIASTOLIC BLOOD PRESSURE: 75 MMHG | HEIGHT: 64 IN | WEIGHT: 147.13 LBS | HEART RATE: 84 BPM

## 2022-12-20 DIAGNOSIS — R09.89 SYMPTOMS OF UPPER RESPIRATORY INFECTION (URI): ICD-10-CM

## 2022-12-20 DIAGNOSIS — J03.90 TONSILLITIS: Primary | ICD-10-CM

## 2022-12-20 DIAGNOSIS — R68.89 FLU-LIKE SYMPTOMS: ICD-10-CM

## 2022-12-20 LAB
CTP QC/QA: YES
FLUAV AG NPH QL: NEGATIVE
FLUBV AG NPH QL: NEGATIVE
SARS-COV-2 RNA RESP QL NAA+PROBE: NOT DETECTED
STREP A PCR (OHS): NOT DETECTED

## 2022-12-20 PROCEDURE — 87635 SARS-COV-2 COVID-19 AMP PRB: CPT | Performed by: NURSE PRACTITIONER

## 2022-12-20 PROCEDURE — 87804 INFLUENZA ASSAY W/OPTIC: CPT | Mod: 59,PBBFAC | Performed by: NURSE PRACTITIONER

## 2022-12-20 PROCEDURE — 99215 OFFICE O/P EST HI 40 MIN: CPT | Mod: PBBFAC | Performed by: NURSE PRACTITIONER

## 2022-12-20 PROCEDURE — 99213 OFFICE O/P EST LOW 20 MIN: CPT | Mod: S$PBB,,, | Performed by: NURSE PRACTITIONER

## 2022-12-20 PROCEDURE — 87651 STREP A DNA AMP PROBE: CPT | Performed by: NURSE PRACTITIONER

## 2022-12-20 PROCEDURE — 99213 PR OFFICE/OUTPT VISIT, EST, LEVL III, 20-29 MIN: ICD-10-PCS | Mod: S$PBB,,, | Performed by: NURSE PRACTITIONER

## 2022-12-20 RX ORDER — FLUCONAZOLE 150 MG/1
150 TABLET ORAL DAILY
Qty: 1 TABLET | Refills: 0 | Status: SHIPPED | OUTPATIENT
Start: 2022-12-20 | End: 2022-12-21

## 2022-12-20 RX ORDER — AMOXICILLIN 875 MG/1
875 TABLET, FILM COATED ORAL EVERY 12 HOURS
Qty: 20 TABLET | Refills: 0 | Status: SHIPPED | OUTPATIENT
Start: 2022-12-20 | End: 2022-12-30

## 2022-12-20 RX ORDER — BENZONATATE 200 MG/1
200 CAPSULE ORAL 3 TIMES DAILY PRN
Qty: 30 CAPSULE | Refills: 0 | Status: SHIPPED | OUTPATIENT
Start: 2022-12-20 | End: 2022-12-30

## 2022-12-20 NOTE — PROGRESS NOTES
"Subjective:       Patient ID: Patricia Guzman is a 22 y.o. female.    Vitals:  height is 5' 4" (1.626 m) and weight is 66.7 kg (147 lb 1.6 oz). Her oral temperature is 97.9 °F (36.6 °C). Her blood pressure is 115/75 and her pulse is 84. Her respiration is 18 and oxygen saturation is 99%.     Chief Complaint: Sore Throat (Diff. Swallowing. X2 days) and Nasal Congestion (xLast night. )    HPI throat pain, big tonsils, painful swallowing, nasal congestion, PND, moderate coughing.  ROS    Objective:      Physical Exam   Constitutional: She is oriented to person, place, and time.  Non-toxic appearance. She appears ill. No distress. normal  HENT:   Nose: Rhinorrhea and congestion present.   Mouth/Throat: Posterior oropharyngeal edema and posterior oropharyngeal erythema present. Tonsils are 2+ on the right. Tonsils are 3+ on the left. No tonsillar exudate.   Muffled speaking voice      Comments: Muffled speaking voice  Eyes: Conjunctivae are normal.   Cardiovascular: Normal heart sounds.   Pulmonary/Chest: Effort normal. No stridor. No respiratory distress. She has no wheezes. She has no rales.         Comments: coarse    Abdominal: Normal appearance.   Musculoskeletal:      Cervical back: She exhibits tenderness.   Lymphadenopathy:     She has cervical adenopathy.   Neurological: She is alert and oriented to person, place, and time.   Skin: Skin is warm, dry and not diaphoretic.   Psychiatric: Her behavior is normal. Mood, judgment and thought content normal.   Vitals reviewed.      Assessment:       1. Tonsillitis    2. Symptoms of upper respiratory infection (URI)    3. Flu-like symptoms          Results for orders placed or performed in visit on 12/20/22   POCT Influenza A/B   Result Value Ref Range    Rapid Influenza A Ag Negative Negative    Rapid Influenza B Ag Negative Negative     Acceptable Yes      Invalid result on rapid COVID 19 test, nursing converted to PCR.    Plan:         Tonsillitis  -    "  Strep Group A by PCR; Future; Expected date: 12/20/2022    Symptoms of upper respiratory infection (URI)  -     Cancel: POCT COVID-19 Rapid Screening  -     COVID-19 Routine Screening    Flu-like symptoms  -     POCT Influenza A/B    Other orders  -     amoxicillin (AMOXIL) 875 MG tablet; Take 1 tablet (875 mg total) by mouth every 12 (twelve) hours. for 10 days  Dispense: 20 tablet; Refill: 0  -     fluconazole (DIFLUCAN) 150 MG Tab; Take 1 tablet (150 mg total) by mouth once daily. for 1 day  Dispense: 1 tablet; Refill: 0  -     benzonatate (TESSALON) 200 MG capsule; Take 1 capsule (200 mg total) by mouth 3 (three) times daily as needed.  Dispense: 30 capsule; Refill: 0         See patient education for guidance.    We will notify you if your Strep test is positive.  If you need antibiotics, we will automatically send them to your pharmacy and notify you that we did that.    Otherwise, you just have a viral infection causing a sore throat that will get better on it's own. Take tylenol and motrin as you need it. Cold water, fluids.

## 2022-12-20 NOTE — PATIENT INSTRUCTIONS
See patient education for guidance.    We will notify you if your Strep test is positive.  If you need antibiotics, we will automatically send them to your pharmacy and notify you that we did that.    Otherwise, you just have a viral infection causing a sore throat that will get better on it's own. Take tylenol and motrin as you need it. Cold water, fluids.

## 2023-02-07 ENCOUNTER — HOSPITAL ENCOUNTER (EMERGENCY)
Facility: HOSPITAL | Age: 23
Discharge: HOME | End: 2023-02-07
Attending: EMERGENCY MEDICINE
Payer: MEDICAID

## 2023-02-07 VITALS
WEIGHT: 146.06 LBS | SYSTOLIC BLOOD PRESSURE: 95 MMHG | BODY MASS INDEX: 24.94 KG/M2 | RESPIRATION RATE: 16 BRPM | TEMPERATURE: 102 F | HEART RATE: 95 BPM | DIASTOLIC BLOOD PRESSURE: 59 MMHG | OXYGEN SATURATION: 99 % | HEIGHT: 64 IN

## 2023-02-07 DIAGNOSIS — U07.1 COVID-19 VIRUS INFECTION: Primary | ICD-10-CM

## 2023-02-07 DIAGNOSIS — A41.9 SEPSIS: ICD-10-CM

## 2023-02-07 LAB
ALBUMIN SERPL-MCNC: 3.9 G/DL (ref 3.5–5)
ALBUMIN/GLOB SERPL: 1.6 RATIO (ref 1.1–2)
ALP SERPL-CCNC: 50 UNIT/L (ref 40–150)
ALT SERPL-CCNC: 9 UNIT/L (ref 0–55)
APPEARANCE UR: CLEAR
AST SERPL-CCNC: 12 UNIT/L (ref 5–34)
B-HCG UR QL: NEGATIVE
BACTERIA #/AREA URNS AUTO: ABNORMAL /HPF
BASOPHILS # BLD AUTO: 0.02 X10(3)/MCL (ref 0–0.2)
BASOPHILS NFR BLD AUTO: 0.3 %
BILIRUB UR QL STRIP.AUTO: NEGATIVE MG/DL
BILIRUBIN DIRECT+TOT PNL SERPL-MCNC: 0.4 MG/DL
BUN SERPL-MCNC: 7 MG/DL (ref 7–18.7)
CALCIUM SERPL-MCNC: 9 MG/DL (ref 8.4–10.2)
CHLORIDE SERPL-SCNC: 111 MMOL/L (ref 98–107)
CO2 SERPL-SCNC: 18 MMOL/L (ref 22–29)
COLOR UR AUTO: YELLOW
CREAT SERPL-MCNC: 0.88 MG/DL (ref 0.55–1.02)
CTP QC/QA: YES
EOSINOPHIL # BLD AUTO: 0.02 X10(3)/MCL (ref 0–0.9)
EOSINOPHIL NFR BLD AUTO: 0.3 %
ERYTHROCYTE [DISTWIDTH] IN BLOOD BY AUTOMATED COUNT: 12.9 % (ref 11.5–17)
FLUAV AG UPPER RESP QL IA.RAPID: NOT DETECTED
FLUBV AG UPPER RESP QL IA.RAPID: NOT DETECTED
GFR SERPLBLD CREATININE-BSD FMLA CKD-EPI: >60 MLS/MIN/1.73/M2
GLOBULIN SER-MCNC: 2.5 GM/DL (ref 2.4–3.5)
GLUCOSE SERPL-MCNC: 113 MG/DL (ref 74–100)
GLUCOSE UR QL STRIP.AUTO: NORMAL MG/DL
HCT VFR BLD AUTO: 34.2 % (ref 37–47)
HGB BLD-MCNC: 11.6 GM/DL (ref 12–16)
HYALINE CASTS #/AREA URNS LPF: ABNORMAL /LPF
IMM GRANULOCYTES # BLD AUTO: 0.02 X10(3)/MCL (ref 0–0.04)
IMM GRANULOCYTES NFR BLD AUTO: 0.3 %
KETONES UR QL STRIP.AUTO: ABNORMAL MG/DL
LACTATE SERPL-SCNC: 1.9 MMOL/L (ref 0.5–2.2)
LEUKOCYTE ESTERASE UR QL STRIP.AUTO: 25 UNIT/L
LYMPHOCYTES # BLD AUTO: 0.3 X10(3)/MCL (ref 0.6–4.6)
LYMPHOCYTES NFR BLD AUTO: 5 %
MCH RBC QN AUTO: 30.5 PG
MCHC RBC AUTO-ENTMCNC: 33.9 MG/DL (ref 33–36)
MCV RBC AUTO: 90 FL (ref 80–94)
MONOCYTES # BLD AUTO: 0.46 X10(3)/MCL (ref 0.1–1.3)
MONOCYTES NFR BLD AUTO: 7.6 %
MUCOUS THREADS URNS QL MICRO: ABNORMAL /LPF
NEUTROPHILS # BLD AUTO: 5.22 X10(3)/MCL (ref 2.1–9.2)
NEUTROPHILS NFR BLD AUTO: 86.5 %
NITRITE UR QL STRIP.AUTO: NEGATIVE
NRBC BLD AUTO-RTO: 0 %
PH UR STRIP.AUTO: 6 [PH]
PLATELET # BLD AUTO: 150 X10(3)/MCL (ref 130–400)
PMV BLD AUTO: 12.1 FL (ref 7.4–10.4)
POTASSIUM SERPL-SCNC: 3.1 MMOL/L (ref 3.5–5.1)
PROT SERPL-MCNC: 6.4 GM/DL (ref 6.4–8.3)
PROT UR QL STRIP.AUTO: ABNORMAL MG/DL
RBC # BLD AUTO: 3.8 X10(6)/MCL (ref 4.2–5.4)
RBC #/AREA URNS AUTO: ABNORMAL /HPF
RBC UR QL AUTO: NEGATIVE UNIT/L
SARS-COV-2 RNA RESP QL NAA+PROBE: DETECTED
SODIUM SERPL-SCNC: 137 MMOL/L (ref 136–145)
SP GR UR STRIP.AUTO: 1.02
SQUAMOUS #/AREA URNS LPF: ABNORMAL /HPF
UROBILINOGEN UR STRIP-ACNC: NORMAL MG/DL
WBC # SPEC AUTO: 6 X10(3)/MCL (ref 4.5–11.5)
WBC #/AREA URNS AUTO: ABNORMAL /HPF

## 2023-02-07 PROCEDURE — 93005 ELECTROCARDIOGRAM TRACING: CPT

## 2023-02-07 PROCEDURE — 99285 EMERGENCY DEPT VISIT HI MDM: CPT | Mod: 25

## 2023-02-07 PROCEDURE — 0240U COVID/FLU A&B PCR: CPT | Performed by: EMERGENCY MEDICINE

## 2023-02-07 PROCEDURE — 96374 THER/PROPH/DIAG INJ IV PUSH: CPT

## 2023-02-07 PROCEDURE — 96361 HYDRATE IV INFUSION ADD-ON: CPT

## 2023-02-07 PROCEDURE — 87088 URINE BACTERIA CULTURE: CPT | Performed by: EMERGENCY MEDICINE

## 2023-02-07 PROCEDURE — 63600175 PHARM REV CODE 636 W HCPCS: Performed by: EMERGENCY MEDICINE

## 2023-02-07 PROCEDURE — 25000003 PHARM REV CODE 250: Performed by: EMERGENCY MEDICINE

## 2023-02-07 PROCEDURE — 83605 ASSAY OF LACTIC ACID: CPT | Performed by: EMERGENCY MEDICINE

## 2023-02-07 PROCEDURE — 81001 URINALYSIS AUTO W/SCOPE: CPT | Performed by: EMERGENCY MEDICINE

## 2023-02-07 PROCEDURE — 80053 COMPREHEN METABOLIC PANEL: CPT | Performed by: EMERGENCY MEDICINE

## 2023-02-07 PROCEDURE — 87040 BLOOD CULTURE FOR BACTERIA: CPT | Performed by: EMERGENCY MEDICINE

## 2023-02-07 PROCEDURE — 81025 URINE PREGNANCY TEST: CPT | Performed by: EMERGENCY MEDICINE

## 2023-02-07 PROCEDURE — 25000003 PHARM REV CODE 250: Performed by: FAMILY MEDICINE

## 2023-02-07 PROCEDURE — 85025 COMPLETE CBC W/AUTO DIFF WBC: CPT | Performed by: EMERGENCY MEDICINE

## 2023-02-07 RX ORDER — POTASSIUM CHLORIDE 20 MEQ/1
40 TABLET, EXTENDED RELEASE ORAL
Status: COMPLETED | OUTPATIENT
Start: 2023-02-07 | End: 2023-02-07

## 2023-02-07 RX ORDER — ACETAMINOPHEN 500 MG
1000 TABLET ORAL
Status: COMPLETED | OUTPATIENT
Start: 2023-02-07 | End: 2023-02-07

## 2023-02-07 RX ORDER — ONDANSETRON 2 MG/ML
4 INJECTION INTRAMUSCULAR; INTRAVENOUS
Status: COMPLETED | OUTPATIENT
Start: 2023-02-07 | End: 2023-02-07

## 2023-02-07 RX ADMIN — SODIUM CHLORIDE, POTASSIUM CHLORIDE, SODIUM LACTATE AND CALCIUM CHLORIDE 1989 ML: 600; 310; 30; 20 INJECTION, SOLUTION INTRAVENOUS at 07:02

## 2023-02-07 RX ADMIN — ONDANSETRON 4 MG: 2 INJECTION INTRAMUSCULAR; INTRAVENOUS at 07:02

## 2023-02-07 RX ADMIN — ACETAMINOPHEN 1000 MG: 500 TABLET ORAL at 07:02

## 2023-02-07 RX ADMIN — POTASSIUM CHLORIDE 40 MEQ: 20 TABLET, EXTENDED RELEASE ORAL at 08:02

## 2023-02-07 NOTE — LETTER
"     [facility] - Emergency Dept  [address] [address]  Phone: [phone]  Fax: [phone]   February 7, 2023    Patient: Patricia Guzman (Lucy)   YOB: 2000   Date of Visit: 2/7/2023   Patient ID [id number]       To Whom It May Concern:    Patricia Guzman (Lucy) was seen and treated in our emergency department on 2/7/2023. She may return to school on 02/13/2023 and may return to work on 02/13/2023 .      Sincerely,   KMOAL Godfrey RN         ,       "

## 2023-02-07 NOTE — ED PROVIDER NOTES
Encounter Date: 2/7/2023       History     Chief Complaint   Patient presents with    Fever    Weakness     States running fever and hallucinating.  Weakness and nausea as well.     Ms. Patricia Guzman is a 21 yo female PMHx migraines, depression, who presents with chief complaint fever. Onset was yesterday when she began having fever Tmax 101.8 for which she has been taking acetaminophen but it does not seem to make it go away. Associated symptoms of body aches, nausea, nonbloody diarrhea, fatigue, occipital headache that has been constant and aggravated with bright lights and loud noises states it is not typical of her migraines but is not the worst headache of her life, and states that when she woke up this morning she was disoriented and hallucinating. She reports that she saw some old man that she was supposed to shake his hand and thought that she was in her old house and had to get ready for an exam today. Denies chest pain, cough, sore throat, abdominal pain, dysuria, neck stiffness, numbness or weakness down one side of her body.    The history is provided by the patient.   Review of patient's allergies indicates:  No Known Allergies  Past Medical History:   Diagnosis Date    Depression     Headache     History of psychiatric care     Hx of psychiatric care     Psychiatric exam requested by authority     Psychiatric problem     Therapy      Past Surgical History:   Procedure Laterality Date    COLONOSCOPY N/A 12/14/2015    Procedure: COLONOSCOPY;  Surgeon: Arleen Sánchez MD;  Location: [address]);  Service: Endoscopy;  Laterality: N/A;     Family History   Problem Relation Age of Onset    Depression Father     Inflammatory bowel disease Father     Hypertension Father     Depression Paternal Grandmother     Breast cancer Paternal Grandmother 65    Depression Maternal Aunt         pscyh hospitalization in past    Bipolar disorder Mother         Rx Lamictal, prozac    Inflammatory bowel disease  Mother     Depression Paternal Aunt     Depression Maternal Grandfather     Bipolar disorder Maternal Grandmother     Ovarian cancer Neg Hx     Colon cancer Neg Hx     Diabetes Neg Hx      Social History     Tobacco Use    Smoking status: Never    Smokeless tobacco: Never   Substance Use Topics    Alcohol use: Yes     Alcohol/week: 2.0 - 3.0 standard drinks     Types: 2 - 3 Glasses of wine per week     Comment: occassionally    Drug use: Yes     Types: Marijuana     Review of Systems    Physical Exam     Initial Vitals [02/07/23 0542]   BP Pulse Resp Temp SpO2   104/68 (!) 124 17 (!) 101.8 °F (38.8 °C) 98 %      MAP       --         Physical Exam    Nursing note and vitals reviewed.  Constitutional: Vital signs are normal. She appears well-developed and well-nourished.   HENT:   Head: Normocephalic and atraumatic.   Mouth/Throat: Uvula is midline and mucous membranes are normal.   Eyes: EOM are normal. Pupils are equal, round, and reactive to light.   Neck: Trachea normal. Neck supple. No Brudzinski's sign and no Kernig's sign noted.   Cardiovascular:  Regular rhythm, intact distal pulses and normal pulses.   Tachycardia present.         Pulmonary/Chest: Effort normal and breath sounds normal.   Abdominal: Abdomen is soft. Bowel sounds are normal. There is no rebound and no guarding.   Musculoskeletal:         General: Normal range of motion.      Cervical back: Neck supple. No rigidity.     Neurological: She is alert and oriented to person, place, and time. GCS score is 15. GCS eye subscore is 4. GCS verbal subscore is 5. GCS motor subscore is 6.   Skin: Skin is warm and dry.   Psychiatric: Her speech is normal and behavior is normal. Judgment and thought content normal. Her mood appears anxious. She is not actively hallucinating. Cognition and memory are normal.       ED Course   Procedures  Labs Reviewed   COMPREHENSIVE METABOLIC PANEL - Abnormal; Notable for the following components:       Result Value     Potassium Level 3.1 (*)     Chloride 111 (*)     Carbon Dioxide 18 (*)     Glucose Level 113 (*)     All other components within normal limits   CBC WITH DIFFERENTIAL - Abnormal; Notable for the following components:    RBC 3.80 (*)     Hgb 11.6 (*)     Hct 34.2 (*)     MPV 12.1 (*)     Lymph # 0.30 (*)     All other components within normal limits   COVID/FLU A&B PCR - Abnormal; Notable for the following components:    SARS-CoV-2 PCR Detected (*)     All other components within normal limits    Narrative:     The Xpert Xpress SARS-CoV-2/FLU/RSV plus is a rapid, multiplexed real-time PCR test intended for the simultaneous qualitative detection and differentiation of SARS-CoV-2, Influenza A, Influenza B, and respiratory syncytial virus (RSV) viral RNA in either nasopharyngeal swab or nasal swab specimens.         LACTIC ACID, PLASMA - Normal   BLOOD CULTURE [facility]   BLOOD CULTURE [facility]   CBC W/ AUTO DIFFERENTIAL    Narrative:     The following orders were created for panel order CBC auto differential.  Procedure                               Abnormality         Status                     ---------                               -----------         ------                     CBC with Differential[[id number]]        Abnormal            Final result                 Please view results for these tests on the individual orders.   URINALYSIS, REFLEX TO URINE CULTURE   EXTRA TUBES    Narrative:     The following orders were created for panel order EXTRA TUBES.  Procedure                               Abnormality         Status                     ---------                               -----------         ------                     Light Blue Top Hold[[id number]]                              In process                 Red Top Hold[[id number]]                                     In process                 Gold Top Hold[[id number]]                                    In process                 Pink Top Hold[[id number]]                                     In process                   Please view results for these tests on the individual orders.   LIGHT BLUE TOP HOLD   RED TOP HOLD   GOLD TOP HOLD   PINK TOP HOLD   POCT URINE PREGNANCY        ECG Results              EKG 12-lead (In process)  Result time 02/07/23 08:55:34      In process by Interface, Lab In [facility] (02/07/23 08:55:34)                   Narrative:    Test Reason : A41.9,    Vent. Rate : 093 BPM     Atrial Rate : 093 BPM     P-R Int : 140 ms          QRS Dur : 076 ms      QT Int : 372 ms       P-R-T Axes : 050 068 039 degrees     QTc Int : 462 ms    Normal sinus rhythm  Normal ECG  When compared with ECG of 08-DEC-2015 00:04,  PREVIOUS ECG IS PRESENT    Referred By: [email or web address]   SELF           Confirmed By:                                   Imaging Results              CT Head Without Contrast (Final result)  Result time 02/07/23 08:06:18      Final result by Jesus Serrato MD (02/07/23 08:06:18)                   Impression:      No acute intracranial process identified.      Electronically signed by: Jesus Serrato  Date:    02/07/2023  Time:    08:06               Narrative:    EXAMINATION:  CT HEAD WITHOUT CONTRAST    CLINICAL HISTORY:  Meningitis/CNS infection suspected;    TECHNIQUE:  CT images of the head without IV contrast. Axial, coronal and sagittal images are reviewed. Dose length product is 917 mGycm. Automatic exposure control, adjustment of mA/kV or iterative reconstruction technique was used to limit radiation dose.    COMPARISON:  No relevant comparison studies available at the time of dictation.    FINDINGS:  Extra-axial spaces/ventricular system: Normal for age.    Intracranial hemorrhage: None identified.    Cerebral parenchyma: No acute large vessel territory infarct or mass effect identified.    Vascular system: No hyperdense vessel appreciated.    Cerebellum: Normal.    Sella: Normal.    Included paranasal sinuses and mastoid air cells: Well-aerated.    Visualized  orbits: Normal.    Scalp/Calvarium: No depressed skull fracture.                                       X-Ray Chest AP Portable (Final result)  Result time 02/07/23 08:08:41      Final result by Jesus Serrato MD (02/07/23 08:08:41)                   Impression:      No acute pulmonary process identified.      Electronically signed by: Jesus Serrato  Date:    02/07/2023  Time:    08:08               Narrative:    EXAMINATION:  XR CHEST AP PORTABLE    CLINICAL HISTORY:  Sepsis;    TECHNIQUE:  Frontal view(s) of the chest.    COMPARISON:  Radiography 10/13/2018    FINDINGS:  Normal cardiac silhouette.  The lungs are well-inflated.  No consolidation identified.  No significant pleural effusion or discernible pneumothorax.                                       Medications   lactated ringers bolus 1,989 mL (0 mLs Intravenous Stopped 2/7/23 0851)   ondansetron injection 4 mg (4 mg Intravenous Given 2/7/23 0700)   acetaminophen tablet 1,000 mg (1,000 mg Oral Given 2/7/23 0750)   potassium chloride SA CR tablet 40 mEq (40 mEq Oral Given 2/7/23 0857)     Medical Decision Making:   Initial Assessment:   21 yo female presents with fever Tmax 101.8 associated with headache, body aches, nausea, nonbloody diarrhea, and reports that when she woke up this morning she was disoriented and hallucinating. She has no meningeal signs on exam and no focal neurologic deficits. Sepsis orders initiated and she is given IV zosyn. Will also check for covid and flu and get CT head to rule out intracranial process.    CBC shows no leukocytosis or leukopenia, lymphopenia of 0.3. I suspect that she has covid. I have low suspicion for meningoencephalitis as she has no nuchal rigidity or focal neurologic deficit. Patient will be signed out to oncoming provider at shift change who will assume care and determine appropriate disposition.  Clinical Tests:   Lab Tests: Ordered and Reviewed  Radiological Study: Ordered  Medical Tests: Ordered            ED Course as of 02/07/23 0858   Tue Feb 07, 2023   0730 Pt care transitioned to me at 0730. I have reviewed the above and agree with documentation. Pt currently with no acute neuro deficits. Will contd to monitor [AK]   0853 Reviewed all labs and imaging with patient at bedside.  Pt with covid infection. Pt reports she feels better with treatment in ED.   [AK]   0854 The patient is resting comfortably and is in no acute distress.  Patricia Guzman states that symptoms have improved after treatment within ER. Discussed test results, shared treatment plan, specific conditions for return, and importance of follow up with patient and family. The patient understands and agrees with the plan as discussed. Answered all patient questions at this time.  Patricia Guzman has remained hemodynamically stable throughout the ED course and is appropriate for discharge home.    [AK]   0854 Advised tylenol or ibuprofen prn pain or fever . Pt verbalized understanding.   [AK]      ED Course User Index  [AK] Elizabeth Giang MD                 Clinical Impression:   Final diagnoses:  [A41.9] Sepsis  [U07.1] COVID-19 virus infection (Primary)        ED Disposition Condition    Discharge Stable          ED Prescriptions    None       Follow-up Information       Follow up With Specialties Details Why Contact Info    [facility] Emergency Medicine  As needed, If symptoms worsen [address] [facility] [address] [phone]    [facility] Internal Medicine Schedule an appointment as soon as possible for a visit in 1 week  [address] [facility] [address] [phone]             Elizabeth Giang MD  02/07/23 0858

## 2023-02-09 LAB — BACTERIA UR CULT: NO GROWTH

## 2023-02-12 LAB
BACTERIA BLD CULT: NORMAL
BACTERIA BLD CULT: NORMAL

## 2023-05-09 DIAGNOSIS — F33.41 RECURRENT MAJOR DEPRESSION IN PARTIAL REMISSION: ICD-10-CM

## 2023-05-10 RX ORDER — FLUOXETINE HYDROCHLORIDE 40 MG/1
CAPSULE ORAL
Qty: 30 CAPSULE | Refills: 0 | Status: SHIPPED | OUTPATIENT
Start: 2023-05-10 | End: 2023-07-07 | Stop reason: SDUPTHER

## 2023-05-15 ENCOUNTER — TELEPHONE (OUTPATIENT)
Dept: PSYCHIATRY | Facility: CLINIC | Age: 23
End: 2023-05-15
Payer: MEDICAID

## 2023-05-15 ENCOUNTER — PATIENT MESSAGE (OUTPATIENT)
Dept: PSYCHIATRY | Facility: CLINIC | Age: 23
End: 2023-05-15
Payer: MEDICAID

## 2023-05-18 ENCOUNTER — OFFICE VISIT (OUTPATIENT)
Dept: INTERNAL MEDICINE | Facility: CLINIC | Age: 23
End: 2023-05-18
Payer: MEDICAID

## 2023-05-18 VITALS
TEMPERATURE: 98 F | SYSTOLIC BLOOD PRESSURE: 104 MMHG | WEIGHT: 153.19 LBS | HEIGHT: 63 IN | RESPIRATION RATE: 16 BRPM | HEART RATE: 88 BPM | DIASTOLIC BLOOD PRESSURE: 70 MMHG | BODY MASS INDEX: 27.14 KG/M2

## 2023-05-18 DIAGNOSIS — G43.001 MIGRAINE WITHOUT AURA AND WITH STATUS MIGRAINOSUS, NOT INTRACTABLE: Chronic | ICD-10-CM

## 2023-05-18 DIAGNOSIS — F33.41 DEPRESSION, MAJOR, RECURRENT, IN PARTIAL REMISSION: Chronic | ICD-10-CM

## 2023-05-18 DIAGNOSIS — Z00.00 WELLNESS EXAMINATION: ICD-10-CM

## 2023-05-18 DIAGNOSIS — Z11.3 SCREEN FOR STD (SEXUALLY TRANSMITTED DISEASE): ICD-10-CM

## 2023-05-18 PROCEDURE — 3074F PR MOST RECENT SYSTOLIC BLOOD PRESSURE < 130 MM HG: ICD-10-PCS | Mod: CPTII,,, | Performed by: NURSE PRACTITIONER

## 2023-05-18 PROCEDURE — 99214 OFFICE O/P EST MOD 30 MIN: CPT | Mod: S$PBB,,, | Performed by: NURSE PRACTITIONER

## 2023-05-18 PROCEDURE — 3008F PR BODY MASS INDEX (BMI) DOCUMENTED: ICD-10-PCS | Mod: CPTII,,, | Performed by: NURSE PRACTITIONER

## 2023-05-18 PROCEDURE — 1159F PR MEDICATION LIST DOCUMENTED IN MEDICAL RECORD: ICD-10-PCS | Mod: CPTII,,, | Performed by: NURSE PRACTITIONER

## 2023-05-18 PROCEDURE — 3078F PR MOST RECENT DIASTOLIC BLOOD PRESSURE < 80 MM HG: ICD-10-PCS | Mod: CPTII,,, | Performed by: NURSE PRACTITIONER

## 2023-05-18 PROCEDURE — 3074F SYST BP LT 130 MM HG: CPT | Mod: CPTII,,, | Performed by: NURSE PRACTITIONER

## 2023-05-18 PROCEDURE — 3008F BODY MASS INDEX DOCD: CPT | Mod: CPTII,,, | Performed by: NURSE PRACTITIONER

## 2023-05-18 PROCEDURE — 1159F MED LIST DOCD IN RCRD: CPT | Mod: CPTII,,, | Performed by: NURSE PRACTITIONER

## 2023-05-18 PROCEDURE — 99214 PR OFFICE/OUTPT VISIT, EST, LEVL IV, 30-39 MIN: ICD-10-PCS | Mod: S$PBB,,, | Performed by: NURSE PRACTITIONER

## 2023-05-18 PROCEDURE — 3078F DIAST BP <80 MM HG: CPT | Mod: CPTII,,, | Performed by: NURSE PRACTITIONER

## 2023-05-18 PROCEDURE — 99214 OFFICE O/P EST MOD 30 MIN: CPT | Mod: PBBFAC | Performed by: NURSE PRACTITIONER

## 2023-05-18 RX ORDER — LACTULOSE 10 G/15ML
10 SOLUTION ORAL 2 TIMES DAILY PRN
Qty: 300 ML | Refills: 0 | Status: SHIPPED | OUTPATIENT
Start: 2023-05-18

## 2023-05-18 NOTE — PROGRESS NOTES
"Internal Medicine Clinic  [facility]     Patient Name: Patricia Guzman   : 2000  MRN:[id number]     Chief Complaint     Chief Complaint   Patient presents with    [facility]     Check up,GYN referral        History of Present Illness     23 year old white female, presents in clinic to establish PCP. PMH Migraines, menorrhagia, constipation, external hemorrhoid, major depression. She is following Dr. Armani Doe for depression and is on Prozac 40 daily. Following [facility] clinic for migraines; on topiramate 50 mg once at bedtime and Sumatriptan prn.  LMP 2023,cycles are regular, lasting 6-7 days, described as heavy and painful. She was OCP from age [age]. Pt is sexually active, denies past pregnancy or STIs. Colonoscopy at age 15; diagnosed with abdominal migraines. States using OTC stimulants in order to produce BM.   Denies chest pain, shortness of breath, cough, fever, headache, dizziness, weakness, abdominal pain, nausea, vomiting, diarrhea, constipation, dysuria, depression, anxiety, SI, HI.    Works at [employer], and studying Health Information Management         Review of Systems     Review of Systems   Constitutional: Negative.    HENT: Negative.     Eyes: Negative.    Respiratory: Negative.     Cardiovascular: Negative.    Gastrointestinal:  Positive for constipation.   Endocrine: Negative.    Genitourinary: Negative.    Musculoskeletal: Negative.    Integumentary:  Negative.   Allergic/Immunologic: Negative.    Neurological: Negative.    Hematological: Negative.    Psychiatric/Behavioral: Negative.     All other systems reviewed and are negative.     Physical Examination     Visit Vitals  /70 (BP Location: Left arm, Patient Position: Sitting, BP Method: Medium (Automatic))   Pulse 88   Temp 97.6 °F (36.4 °C) (Oral)   Resp 16   Ht 5' 3" (1.6 m)   Wt 69.5 kg (153 lb 3.2 oz)   BMI 27.14 kg/m²        BP Readings from Last 6 Encounters:   23 104/70   23 (!) 95/59 "   12/20/22 115/75   12/07/22 108/71   09/06/22 116/77   01/06/20 120/70   ]    Wt Readings from Last 6 Encounters:   05/18/23 69.5 kg (153 lb 3.2 oz)   02/07/23 66.3 kg (146 lb 0.9 oz)   12/20/22 66.7 kg (147 lb 1.6 oz)   12/07/22 67.2 kg (148 lb 3.2 oz)   09/06/22 68 kg (149 lb 14.6 oz)   01/06/20 56.6 kg (124 lb 12.5 oz)   ]      Physical Exam  Vitals and nursing note reviewed.   Constitutional:       Appearance: Normal appearance.   HENT:      Head: Normocephalic and atraumatic.      Right Ear: Tympanic membrane, ear canal and external ear normal.      Left Ear: Tympanic membrane, ear canal and external ear normal.      Nose: Nose normal.      Mouth/Throat:      Mouth: Mucous membranes are moist.      Pharynx: Oropharynx is clear.   Eyes:      Extraocular Movements: Extraocular movements intact.      Conjunctiva/sclera: Conjunctivae normal.      Pupils: Pupils are equal, round, and reactive to light.   Cardiovascular:      Rate and Rhythm: Normal rate and regular rhythm.      Pulses: Normal pulses.      Heart sounds: Normal heart sounds.   Pulmonary:      Effort: Pulmonary effort is normal.      Breath sounds: Normal breath sounds.   Abdominal:      General: Abdomen is flat. Bowel sounds are normal.      Palpations: Abdomen is soft.   Musculoskeletal:         General: Normal range of motion.      Cervical back: Normal range of motion and neck supple.   Skin:     General: Skin is warm and dry.      Capillary Refill: Capillary refill takes less than 2 seconds.   Neurological:      General: No focal deficit present.      Mental Status: She is alert and oriented to person, place, and time. Mental status is at baseline.   Psychiatric:         Mood and Affect: Mood normal.         Behavior: Behavior normal.         Thought Content: Thought content normal.         Judgment: Judgment normal.        Labs / Imaging     Chemistry:  Lab Results   Component Value Date     02/07/2023     11/23/2015    K 3.1 (L)  02/07/2023    K 4.3 11/23/2015    CHLORIDE 111 (H) 02/07/2023    BUN 7.0 02/07/2023    BUN 10 11/23/2015    CREATININE 0.88 02/07/2023    CREATININE 0.9 11/23/2015    EGFRNORACEVR >60 02/07/2023    GLUCOSE 113 (H) 02/07/2023    CALCIUM 9.0 02/07/2023    CALCIUM 9.5 11/23/2015    ALKPHOS 50 02/07/2023    ALKPHOS 73 (L) 11/23/2015    LABPROT 6.4 02/07/2023    ALBUMIN 3.9 02/07/2023    ALBUMIN 3.7 11/23/2015    AST 12 02/07/2023    AST 15 11/23/2015    ALT 9 02/07/2023    ALT 8 (L) 11/23/2015        No results found for: HGBA1C, MICROALBCREA     Hematology:  Lab Results   Component Value Date    WBC 6.0 02/07/2023    WBC 5.83 11/23/2015    RBC 3.80 (L) 02/07/2023    RBC 4.08 (L) 11/23/2015    HGB 11.6 (L) 02/07/2023    HGB 11.7 (L) 11/23/2015    HCT 34.2 (L) 02/07/2023    HCT 35.3 (L) 11/23/2015    MCV 90.0 02/07/2023    MCV 87 11/23/2015    MCH 30.5 02/07/2023    MCH 28.7 11/23/2015    MCHC 33.9 02/07/2023    MCHC 33.1 11/23/2015    RDW 12.9 02/07/2023    RDW 13.8 11/23/2015     02/07/2023     11/23/2015    MPV 12.1 (H) 02/07/2023    MPV 12.2 11/23/2015    GRAN 3.6 11/23/2015    GRAN 62.2 (H) 11/23/2015    LYMPH 1.5 11/23/2015    LYMPH 25.6 (L) 11/23/2015    MONO 0.6 11/23/2015    MONO 10.3 11/23/2015    EOS 0.1 11/23/2015    BASO 0.03 11/23/2015    EOSINOPHIL 1.4 11/23/2015    BASOPHIL 0.5 11/23/2015        Lipid Panel:  No results found for: CHOL, HDL, LDL, TRIG, TOTALCHOLEST     Urine:  Lab Results   Component Value Date    COLORUA Yellow 02/07/2023    APPEARANCEUA Clear 02/07/2023    SGUA 1.025 02/07/2023    PHUA 6.0 02/07/2023    PROTEINUA Trace (A) 02/07/2023    GLUCOSEUA Normal 02/07/2023    KETONESUA 2+ (A) 02/07/2023    BLOODUA Negative 02/07/2023    NITRITESUA Negative 02/07/2023    LEUKOCYTESUR 25 (A) 02/07/2023    RBCUA 0-5 02/07/2023    WBCUA 11-20 (A) 02/07/2023    BACTERIA Trace (A) 02/07/2023    SQEPUA Few (A) 02/07/2023    HYALINECASTS None Seen 02/07/2023          Assessment        ICD-10-CM ICD-9-CM   1. Migraine without aura and with status migrainosus, not intractable  G43.001 346.12   2. Depression, major, recurrent, in partial remission  F33.41 296.35   3. Screen for STD (sexually transmitted disease)  Z11.3 V74.5   4. Wellness examination  Z00.00 V70.0   5. BMI 27.0-27.9,adult  Z68.27 V85.23        Plan   Constipation  RX lactulose prn  Increase dietary fiber and water.   Avoid straining or sitting on the toilet for long periods of time.  Try OTC fiber supplements and stool softeners.     2.. Migraine without aura and with status migrainosus, not intractable  Following DR. Jacobo [facility]  Meds continued  Good sleep hygiene and stress management.   Lifestyle Modifications Discussed: High intensity interval training has shown to reduce headache frequency. Low carb, high protein diet has shown to reduce headache frequency as well.  Instructed to keep headache journal.      3. Depression, major, recurrent, in partial remission  Following Dr. Doe, Cont prozac.   Practice deep breathing or abdominal breathing exercises when anxiety occurs.  Exercise daily. Get sunlight daily.  Avoid caffeine, alcohol and stimulants.  Practice positive phrases and repeat throughout the day, yoga, lavender scents or Chamomile tea will help anxiety.  Set healthy boundaries, avoid people and conversations that increase stress.  Reports any symptoms of suicidal or homicidal ideations immediately, if clinic is closed go to nearest emergency room.     4. Screen for STD (sexually transmitted disease)    - Urinalysis; Future  - Hepatitis C Antibody; Future  - HIV 1/2 Ag/Ab (4th Gen); Future  - SYPHILIS ANTIBODY (WITH REFLEX RPR); Future  - Urinalysis    5. Wellness examination  Info given regarding GYN care at [facility]  - CBC Auto Differential; Future  - Comprehensive Metabolic Panel; Future  - Lipid Panel; Future  - TSH; Future  - Hemoglobin A1C; Future  - Urinalysis; Future  - Vitamin D; Future  -  Hepatitis C Antibody; Future  - HIV 1/2 Ag/Ab (4th Gen); Future  - SYPHILIS ANTIBODY (WITH REFLEX RPR); Future  - HCG Qualitative Urine; Future  - Urinalysis    6. BMI 27.0-27.9,adult  Goal BMI <30.  Exercise 5 times a week for 30 minutes per day.  Avoid soda, simple sugars, excessive rice, potatoes or bread. Limit fast foods and fried foods.  Choose complex carbs in moderation (example: green vegetables, beans, oatmeal). Eat plenty of fresh fruits and vegetables with lean meats daily.  Do not skip meals. Eat a balanced portion size.  Avoid fad diets. Consider permanent healthy life style changes.           Current Outpatient Medications   Medication Instructions    CASCARA SAGRADA ORAL 450 mg, Oral, Daily    FLUoxetine 40 MG capsule TAKE 1 CAPSULE BY MOUTH EVERY DAY    lactulose 10 gram/15 ml (CHRONULAC) 10 g, Oral, 2 times daily PRN    ondansetron (ZOFRAN ODT ORAL) Oral, prn    sumatriptan (IMITREX) 100 mg, Oral, 2 times daily PRN    topiramate (TOPAMAX) 50 mg, Oral, Nightly       Orders Placed This Encounter   Procedures    CBC Auto Differential    Comprehensive Metabolic Panel    Lipid Panel    TSH    Hemoglobin A1C    Urinalysis    Vitamin D    Hepatitis C Antibody    HIV 1/2 Ag/Ab (4th Gen)    SYPHILIS ANTIBODY (WITH REFLEX RPR)    HCG Qualitative Urine         Future Appointments   Date Time Provider Department Center   6/12/2023  7:45 AM [facility]        Follow up in about 2 weeks (around 6/1/2023) for lab review.    Labs thoroughly reviewed with patient. Medication refills addressed today.  RTC prn and 2 wks, with labs prior to the apt.  COVID 19 precautions given to patient.  Patient voices understanding of all discharge instructions.      [facility]

## 2023-06-09 ENCOUNTER — LAB VISIT (OUTPATIENT)
Dept: LAB | Facility: HOSPITAL | Age: 23
End: 2023-06-09
Attending: NURSE PRACTITIONER
Payer: MEDICAID

## 2023-06-09 DIAGNOSIS — Z00.00 WELLNESS EXAMINATION: ICD-10-CM

## 2023-06-09 DIAGNOSIS — Z11.3 SCREEN FOR STD (SEXUALLY TRANSMITTED DISEASE): ICD-10-CM

## 2023-06-09 LAB
ALBUMIN SERPL-MCNC: 4.1 G/DL (ref 3.5–5)
ALBUMIN/GLOB SERPL: 1.2 RATIO (ref 1.1–2)
ALP SERPL-CCNC: 50 UNIT/L (ref 40–150)
ALT SERPL-CCNC: 10 UNIT/L (ref 0–55)
APPEARANCE UR: CLEAR
AST SERPL-CCNC: 14 UNIT/L (ref 5–34)
B-HCG SERPL QL: NEGATIVE
BACTERIA #/AREA URNS AUTO: ABNORMAL /HPF
BASOPHILS # BLD AUTO: 0.02 X10(3)/MCL
BASOPHILS NFR BLD AUTO: 0.3 %
BILIRUB UR QL STRIP.AUTO: NEGATIVE MG/DL
BILIRUBIN DIRECT+TOT PNL SERPL-MCNC: 0.4 MG/DL
BUN SERPL-MCNC: 11.1 MG/DL (ref 7–18.7)
CALCIUM SERPL-MCNC: 9.3 MG/DL (ref 8.4–10.2)
CHLORIDE SERPL-SCNC: 106 MMOL/L (ref 98–107)
CHOLEST SERPL-MCNC: 165 MG/DL
CHOLEST/HDLC SERPL: 4 {RATIO} (ref 0–5)
CO2 SERPL-SCNC: 26 MMOL/L (ref 22–29)
COLOR UR: ABNORMAL
CREAT SERPL-MCNC: 0.84 MG/DL (ref 0.55–1.02)
DEPRECATED CALCIDIOL+CALCIFEROL SERPL-MC: 46.1 NG/ML (ref 30–80)
EOSINOPHIL # BLD AUTO: 0.11 X10(3)/MCL (ref 0–0.9)
EOSINOPHIL NFR BLD AUTO: 1.9 %
ERYTHROCYTE [DISTWIDTH] IN BLOOD BY AUTOMATED COUNT: 12.9 % (ref 11.5–17)
EST. AVERAGE GLUCOSE BLD GHB EST-MCNC: 91.1 MG/DL
GFR SERPLBLD CREATININE-BSD FMLA CKD-EPI: >60 MLS/MIN/1.73/M2
GLOBULIN SER-MCNC: 3.3 GM/DL (ref 2.4–3.5)
GLUCOSE SERPL-MCNC: 96 MG/DL (ref 74–100)
GLUCOSE UR QL STRIP.AUTO: NORMAL MG/DL
HBA1C MFR BLD: 4.8 %
HCT VFR BLD AUTO: 40 % (ref 37–47)
HCV AB SERPL QL IA: NONREACTIVE
HDLC SERPL-MCNC: 45 MG/DL (ref 35–60)
HGB BLD-MCNC: 13 G/DL (ref 12–16)
HIV 1+2 AB+HIV1 P24 AG SERPL QL IA: NONREACTIVE
HYALINE CASTS #/AREA URNS LPF: ABNORMAL /LPF
IMM GRANULOCYTES # BLD AUTO: 0.01 X10(3)/MCL (ref 0–0.04)
IMM GRANULOCYTES NFR BLD AUTO: 0.2 %
KETONES UR QL STRIP.AUTO: NEGATIVE MG/DL
LDLC SERPL CALC-MCNC: 91 MG/DL (ref 50–140)
LEUKOCYTE ESTERASE UR QL STRIP.AUTO: NEGATIVE UNIT/L
LYMPHOCYTES # BLD AUTO: 1.7 X10(3)/MCL (ref 0.6–4.6)
LYMPHOCYTES NFR BLD AUTO: 28.7 %
MCH RBC QN AUTO: 30 PG (ref 27–31)
MCHC RBC AUTO-ENTMCNC: 32.5 G/DL (ref 33–36)
MCV RBC AUTO: 92.2 FL (ref 80–94)
MONOCYTES # BLD AUTO: 0.37 X10(3)/MCL (ref 0.1–1.3)
MONOCYTES NFR BLD AUTO: 6.3 %
NEUTROPHILS # BLD AUTO: 3.71 X10(3)/MCL (ref 2.1–9.2)
NEUTROPHILS NFR BLD AUTO: 62.6 %
NITRITE UR QL STRIP.AUTO: NEGATIVE
NRBC BLD AUTO-RTO: 0 %
PH UR STRIP.AUTO: 6 [PH]
PLATELET # BLD AUTO: 173 X10(3)/MCL (ref 130–400)
PMV BLD AUTO: 11.6 FL (ref 7.4–10.4)
POTASSIUM SERPL-SCNC: 4.2 MMOL/L (ref 3.5–5.1)
PROT SERPL-MCNC: 7.4 GM/DL (ref 6.4–8.3)
PROT UR QL STRIP.AUTO: NEGATIVE MG/DL
RBC # BLD AUTO: 4.34 X10(6)/MCL (ref 4.2–5.4)
RBC #/AREA URNS AUTO: ABNORMAL /HPF
RBC UR QL AUTO: ABNORMAL UNIT/L
SODIUM SERPL-SCNC: 138 MMOL/L (ref 136–145)
SP GR UR STRIP.AUTO: 1.02
SQUAMOUS #/AREA URNS LPF: ABNORMAL /HPF
T PALLIDUM AB SER QL: NONREACTIVE
TRIGL SERPL-MCNC: 147 MG/DL (ref 37–140)
TSH SERPL-ACNC: 1.34 UIU/ML (ref 0.35–4.94)
UROBILINOGEN UR STRIP-ACNC: NORMAL MG/DL
VLDLC SERPL CALC-MCNC: 29 MG/DL
WBC # SPEC AUTO: 5.92 X10(3)/MCL (ref 4.5–11.5)
WBC #/AREA URNS AUTO: ABNORMAL /HPF

## 2023-06-09 PROCEDURE — 86803 HEPATITIS C AB TEST: CPT

## 2023-06-09 PROCEDURE — 36415 COLL VENOUS BLD VENIPUNCTURE: CPT

## 2023-06-09 PROCEDURE — 82306 VITAMIN D 25 HYDROXY: CPT

## 2023-06-09 PROCEDURE — 83036 HEMOGLOBIN GLYCOSYLATED A1C: CPT

## 2023-06-09 PROCEDURE — 81025 URINE PREGNANCY TEST: CPT

## 2023-06-09 PROCEDURE — 87389 HIV-1 AG W/HIV-1&-2 AB AG IA: CPT

## 2023-06-09 PROCEDURE — 80053 COMPREHEN METABOLIC PANEL: CPT

## 2023-06-09 PROCEDURE — 86780 TREPONEMA PALLIDUM: CPT

## 2023-06-09 PROCEDURE — 84443 ASSAY THYROID STIM HORMONE: CPT

## 2023-06-09 PROCEDURE — 85025 COMPLETE CBC W/AUTO DIFF WBC: CPT

## 2023-06-09 PROCEDURE — 80061 LIPID PANEL: CPT

## 2023-06-12 ENCOUNTER — OFFICE VISIT (OUTPATIENT)
Dept: INTERNAL MEDICINE | Facility: CLINIC | Age: 23
End: 2023-06-12
Payer: MEDICAID

## 2023-06-12 VITALS
DIASTOLIC BLOOD PRESSURE: 71 MMHG | RESPIRATION RATE: 16 BRPM | WEIGHT: 157.63 LBS | BODY MASS INDEX: 27.93 KG/M2 | SYSTOLIC BLOOD PRESSURE: 104 MMHG | HEART RATE: 90 BPM | TEMPERATURE: 98 F | HEIGHT: 63 IN

## 2023-06-12 DIAGNOSIS — Z11.3 SCREEN FOR STD (SEXUALLY TRANSMITTED DISEASE): ICD-10-CM

## 2023-06-12 DIAGNOSIS — Z00.00 WELLNESS EXAMINATION: ICD-10-CM

## 2023-06-12 DIAGNOSIS — K59.04 CHRONIC IDIOPATHIC CONSTIPATION: ICD-10-CM

## 2023-06-12 PROCEDURE — 99395 PREV VISIT EST AGE 18-39: CPT | Mod: S$PBB,,, | Performed by: NURSE PRACTITIONER

## 2023-06-12 PROCEDURE — 3078F DIAST BP <80 MM HG: CPT | Mod: CPTII,,, | Performed by: NURSE PRACTITIONER

## 2023-06-12 PROCEDURE — 99395 PR PREVENTIVE VISIT,EST,18-39: ICD-10-PCS | Mod: S$PBB,,, | Performed by: NURSE PRACTITIONER

## 2023-06-12 PROCEDURE — 1159F MED LIST DOCD IN RCRD: CPT | Mod: CPTII,,, | Performed by: NURSE PRACTITIONER

## 2023-06-12 PROCEDURE — 1160F PR REVIEW ALL MEDS BY PRESCRIBER/CLIN PHARMACIST DOCUMENTED: ICD-10-PCS | Mod: CPTII,,, | Performed by: NURSE PRACTITIONER

## 2023-06-12 PROCEDURE — 3074F PR MOST RECENT SYSTOLIC BLOOD PRESSURE < 130 MM HG: ICD-10-PCS | Mod: CPTII,,, | Performed by: NURSE PRACTITIONER

## 2023-06-12 PROCEDURE — 3008F PR BODY MASS INDEX (BMI) DOCUMENTED: ICD-10-PCS | Mod: CPTII,,, | Performed by: NURSE PRACTITIONER

## 2023-06-12 PROCEDURE — 3078F PR MOST RECENT DIASTOLIC BLOOD PRESSURE < 80 MM HG: ICD-10-PCS | Mod: CPTII,,, | Performed by: NURSE PRACTITIONER

## 2023-06-12 PROCEDURE — 1160F RVW MEDS BY RX/DR IN RCRD: CPT | Mod: CPTII,,, | Performed by: NURSE PRACTITIONER

## 2023-06-12 PROCEDURE — 3008F BODY MASS INDEX DOCD: CPT | Mod: CPTII,,, | Performed by: NURSE PRACTITIONER

## 2023-06-12 PROCEDURE — 99214 OFFICE O/P EST MOD 30 MIN: CPT | Mod: PBBFAC | Performed by: NURSE PRACTITIONER

## 2023-06-12 PROCEDURE — 3074F SYST BP LT 130 MM HG: CPT | Mod: CPTII,,, | Performed by: NURSE PRACTITIONER

## 2023-06-12 PROCEDURE — 1159F PR MEDICATION LIST DOCUMENTED IN MEDICAL RECORD: ICD-10-PCS | Mod: CPTII,,, | Performed by: NURSE PRACTITIONER

## 2023-06-12 NOTE — PROGRESS NOTES
Internal Medicine Clinic  [facility]     Patient Name: Patricia Guzman   : 2000  MRN:[id number]     Chief Complaint     Chief Complaint   Patient presents with    Follow-up     Lab review        History of Present Illness     23 year old white female, presents in clinic for lab f/u. States using OTC stimulants in order to produce BM. Tried on lactulose at prior apt with temporary relief. Miralax OTC does not alleviate symptoms.    PMH Migraines, menorrhagia, constipation, external hemorrhoid, major depression. She is following Dr. Armani Doe for depression and is on Prozac 40 daily. Following [facility] for migraines; on topiramate 50 mg once at bedtime and Sumatriptan prn.  Cycles are regular, lasting 6-7 days, described as heavy and painful. She was OCP from age [age]. Pt is sexually active, denies past pregnancy or STIs. Colonoscopy at age 15; diagnosed with abdominal migraines. States using OTC stimulants in order to produce BM. Tried on lactulose at prior apt with temporary relief.   Denies chest pain, shortness of breath, cough, fever, headache, dizziness, weakness, abdominal pain, nausea, vomiting, diarrhea, constipation, dysuria, depression, anxiety, SI, HI.  Scheduled with [facility] for GYN/pap smear/contraception 6/15/2023  Works at [employer], and studying Health Information Management         Review of Systems     Review of Systems   Constitutional: Negative.    HENT: Negative.     Eyes: Negative.    Respiratory: Negative.     Cardiovascular: Negative.    Gastrointestinal:  Positive for constipation.   Endocrine: Negative.    Genitourinary: Negative.    Musculoskeletal: Negative.    Integumentary:  Negative.   Allergic/Immunologic: Negative.    Neurological: Negative.    Hematological: Negative.    Psychiatric/Behavioral: Negative.     All other systems reviewed and are negative.     Physical Examination     Visit Vitals  /71 (BP Location: Left arm, Patient  "Position: Sitting, BP Method: Medium (Automatic))   Pulse 90   Temp 98.1 °F (36.7 °C) (Oral)   Resp 16   Ht 5' 3" (1.6 m)   Wt 71.5 kg (157 lb 9.6 oz)   BMI 27.92 kg/m²        BP Readings from Last 6 Encounters:   06/12/23 104/71   05/18/23 104/70   02/07/23 (!) 95/59   12/20/22 115/75   12/07/22 108/71   09/06/22 116/77   ]    Wt Readings from Last 6 Encounters:   06/12/23 71.5 kg (157 lb 9.6 oz)   05/18/23 69.5 kg (153 lb 3.2 oz)   02/07/23 66.3 kg (146 lb 0.9 oz)   12/20/22 66.7 kg (147 lb 1.6 oz)   12/07/22 67.2 kg (148 lb 3.2 oz)   09/06/22 68 kg (149 lb 14.6 oz)   ]      Physical Exam  Vitals and nursing note reviewed.   Constitutional:       Appearance: Normal appearance.   HENT:      Head: Normocephalic and atraumatic.      Right Ear: Tympanic membrane, ear canal and external ear normal.      Left Ear: Tympanic membrane, ear canal and external ear normal.      Nose: Nose normal.      Mouth/Throat:      Mouth: Mucous membranes are moist.      Pharynx: Oropharynx is clear.   Eyes:      Extraocular Movements: Extraocular movements intact.      Conjunctiva/sclera: Conjunctivae normal.      Pupils: Pupils are equal, round, and reactive to light.   Cardiovascular:      Rate and Rhythm: Normal rate and regular rhythm.      Pulses: Normal pulses.      Heart sounds: Normal heart sounds.   Pulmonary:      Effort: Pulmonary effort is normal.      Breath sounds: Normal breath sounds.   Abdominal:      General: Abdomen is flat. Bowel sounds are normal.      Palpations: Abdomen is soft.   Musculoskeletal:         General: Normal range of motion.      Cervical back: Normal range of motion and neck supple.   Skin:     General: Skin is warm and dry.      Capillary Refill: Capillary refill takes less than 2 seconds.   Neurological:      General: No focal deficit present.      Mental Status: She is alert and oriented to person, place, and time. Mental status is at baseline.   Psychiatric:         Mood and Affect: Mood normal.  "        Behavior: Behavior normal.         Thought Content: Thought content normal.         Judgment: Judgment normal.        Labs / Imaging     Chemistry:  Lab Results   Component Value Date     06/09/2023     11/23/2015    K 4.2 06/09/2023    K 4.3 11/23/2015    CHLORIDE 106 06/09/2023    BUN 11.1 06/09/2023    BUN 10 11/23/2015    CREATININE 0.84 06/09/2023    CREATININE 0.9 11/23/2015    EGFRNORACEVR >60 06/09/2023    GLUCOSE 96 06/09/2023    CALCIUM 9.3 06/09/2023    CALCIUM 9.5 11/23/2015    ALKPHOS 50 06/09/2023    ALKPHOS 73 (L) 11/23/2015    LABPROT 7.4 06/09/2023    ALBUMIN 4.1 06/09/2023    ALBUMIN 3.7 11/23/2015    AST 14 06/09/2023    AST 15 11/23/2015    ALT 10 06/09/2023    ALT 8 (L) 11/23/2015    VVLUIUMD39KI 46.1 06/09/2023        Lab Results   Component Value Date    HGBA1C 4.8 06/09/2023        Hematology:  Lab Results   Component Value Date    WBC 5.92 06/09/2023    WBC 5.83 11/23/2015    RBC 4.34 06/09/2023    RBC 4.08 (L) 11/23/2015    HGB 13.0 06/09/2023    HGB 11.7 (L) 11/23/2015    HCT 40.0 06/09/2023    HCT 35.3 (L) 11/23/2015    MCV 92.2 06/09/2023    MCV 87 11/23/2015    MCH 30.0 06/09/2023    MCH 28.7 11/23/2015    MCHC 32.5 (L) 06/09/2023    MCHC 33.1 11/23/2015    RDW 12.9 06/09/2023    RDW 13.8 11/23/2015     06/09/2023     11/23/2015    MPV 11.6 (H) 06/09/2023    MPV 12.2 11/23/2015    GRAN 3.6 11/23/2015    GRAN 62.2 (H) 11/23/2015    LYMPH 1.5 11/23/2015    LYMPH 25.6 (L) 11/23/2015    MONO 0.6 11/23/2015    MONO 10.3 11/23/2015    EOS 0.1 11/23/2015    BASO 0.03 11/23/2015    EOSINOPHIL 1.4 11/23/2015    BASOPHIL 0.5 11/23/2015        Lipid Panel:  Lab Results   Component Value Date    CHOL 165 06/09/2023    HDL 45 06/09/2023    LDL 91.00 06/09/2023    TRIG 147 (H) 06/09/2023    TOTALCHOLEST 4 06/09/2023        Urine:  Lab Results   Component Value Date    COLORUA Light-Yellow 06/09/2023    APPEARANCEUA Clear 06/09/2023    SGUA 1.020 06/09/2023    PHUA 6.0  06/09/2023    PROTEINUA Negative 06/09/2023    GLUCOSEUA Normal 06/09/2023    KETONESUA Negative 06/09/2023    BLOODUA 1+ (A) 06/09/2023    NITRITESUA Negative 06/09/2023    LEUKOCYTESUR Negative 06/09/2023    RBCUA 0-5 06/09/2023    WBCUA 0-5 06/09/2023    BACTERIA None Seen 06/09/2023    SQEPUA Occ (A) 06/09/2023    HYALINECASTS None Seen 06/09/2023          Assessment       ICD-10-CM ICD-9-CM   1. Wellness examination  Z00.00 V70.0   2. Chronic idiopathic constipation  K59.04 564.00   3. BMI 27.0-27.9,adult  Z68.27 V85.23   4. Screen for STD (sexually transmitted disease)  Z11.3 V74.5        Plan     1. Wellness examination  Avoid tobacco/ alcohol/ drugs  Regular exercise as tolerated  Healthy lifestyle habits   - CBC Auto Differential; Future  - Comprehensive Metabolic Panel; Future  - Lipid Panel; Future  - TSH; Future  - Urinalysis; Future  - Chlamydia/GC, PCR; Future  - Urinalysis    2. Chronic idiopathic constipation  Increase dietary fiber and water.   Avoid straining or sitting on the toilet for long periods of time.  Try OTC fiber supplements and stool softeners.   - linaCLOtide (LINZESS) 145 mcg Cap capsule; Take 1 capsule (145 mcg total) by mouth before breakfast.  Dispense: 30 capsule; Refill: 11    3. BMI 27.0-27.9,adult  Goal BMI <30.  Exercise 5 times a week for 30 minutes per day.  Avoid soda, simple sugars, excessive rice, potatoes or bread. Limit fast foods and fried foods.  Choose complex carbs in moderation (example: green vegetables, beans, oatmeal). Eat plenty of fresh fruits and vegetables with lean meats daily.  Do not skip meals. Eat a balanced portion size.  Avoid fad diets. Consider permanent healthy life style changes.       4. Screen for STD (sexually transmitted disease)    - Urinalysis; Future  - Chlamydia/GC, PCR; Future  - Urinalysis    Current Outpatient Medications   Medication Instructions    CASCARA SAGRADA ORAL 450 mg, Oral, Daily    FLUoxetine 40 MG capsule TAKE 1 CAPSULE BY  MOUTH EVERY DAY    lactulose 10 gram/15 ml (CHRONULAC) 10 g, Oral, 2 times daily PRN    linaCLOtide (LINZESS) 145 mcg, Oral, Before breakfast    ondansetron (ZOFRAN ODT ORAL) Oral, prn    sumatriptan (IMITREX) 100 mg, Oral, 2 times daily PRN    topiramate (TOPAMAX) 50 mg, Oral, Nightly       Orders Placed This Encounter   Procedures    Chlamydia/GC, PCR    CBC Auto Differential    Comprehensive Metabolic Panel    Lipid Panel    TSH    Urinalysis         Future Appointments   Date Time Provider Department Center   6/12/2024  8:00 AM [facility]         Follow up in about 1 year (around 6/12/2024) for lab review.    Labs thoroughly reviewed with patient. Medication refills addressed today.  RTC prn and 12 months, with labs 1 week prior to the apt.  COVID 19 precautions given to patient.  Patient voices understanding of all discharge instructions.      JHONATHAN Bowden

## 2023-07-07 ENCOUNTER — OFFICE VISIT (OUTPATIENT)
Dept: PSYCHIATRY | Facility: CLINIC | Age: 23
End: 2023-07-07
Payer: MEDICAID

## 2023-07-07 DIAGNOSIS — T88.7XXA SIDE EFFECT OF MEDICATION: ICD-10-CM

## 2023-07-07 DIAGNOSIS — F40.10 SOCIAL ANXIETY DISORDER: ICD-10-CM

## 2023-07-07 DIAGNOSIS — F33.41 RECURRENT MAJOR DEPRESSION IN PARTIAL REMISSION: Primary | ICD-10-CM

## 2023-07-07 DIAGNOSIS — F41.0 PANIC DISORDER WITHOUT AGORAPHOBIA WITH MODERATE PANIC ATTACKS: ICD-10-CM

## 2023-07-07 DIAGNOSIS — F41.1 GAD (GENERALIZED ANXIETY DISORDER): ICD-10-CM

## 2023-07-07 PROCEDURE — 99214 PR OFFICE/OUTPT VISIT, EST, LEVL IV, 30-39 MIN: ICD-10-PCS | Mod: AF,HB,95, | Performed by: PSYCHIATRY & NEUROLOGY

## 2023-07-07 PROCEDURE — 99214 OFFICE O/P EST MOD 30 MIN: CPT | Mod: AF,HB,95, | Performed by: PSYCHIATRY & NEUROLOGY

## 2023-07-07 RX ORDER — FLUOXETINE HYDROCHLORIDE 40 MG/1
40 CAPSULE ORAL DAILY
Qty: 90 CAPSULE | Refills: 1 | Status: SHIPPED | OUTPATIENT
Start: 2023-07-07 | End: 2023-12-12 | Stop reason: SDUPTHER
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
## 2023-07-10 ENCOUNTER — PATIENT MESSAGE (OUTPATIENT)
Dept: ADMINISTRATIVE | Facility: HOSPITAL | Age: 23
End: 2023-07-10
Payer: MEDICAID

## 2023-09-20 ENCOUNTER — OFFICE VISIT (OUTPATIENT)
Dept: INTERNAL MEDICINE | Facility: CLINIC | Age: 23
End: 2023-09-20
Payer: MEDICAID

## 2023-09-20 VITALS
SYSTOLIC BLOOD PRESSURE: 124 MMHG | TEMPERATURE: 98 F | WEIGHT: 160 LBS | HEART RATE: 85 BPM | DIASTOLIC BLOOD PRESSURE: 82 MMHG | OXYGEN SATURATION: 97 % | HEIGHT: 63 IN | BODY MASS INDEX: 28.35 KG/M2 | RESPIRATION RATE: 16 BRPM

## 2023-09-20 DIAGNOSIS — R21 RASH: ICD-10-CM

## 2023-09-20 PROCEDURE — 3079F PR MOST RECENT DIASTOLIC BLOOD PRESSURE 80-89 MM HG: ICD-10-PCS | Mod: CPTII,,, | Performed by: NURSE PRACTITIONER

## 2023-09-20 PROCEDURE — 3074F PR MOST RECENT SYSTOLIC BLOOD PRESSURE < 130 MM HG: ICD-10-PCS | Mod: CPTII,,, | Performed by: NURSE PRACTITIONER

## 2023-09-20 PROCEDURE — 99214 PR OFFICE/OUTPT VISIT, EST, LEVL IV, 30-39 MIN: ICD-10-PCS | Mod: S$PBB,,, | Performed by: NURSE PRACTITIONER

## 2023-09-20 PROCEDURE — 3044F PR MOST RECENT HEMOGLOBIN A1C LEVEL <7.0%: ICD-10-PCS | Mod: CPTII,,, | Performed by: NURSE PRACTITIONER

## 2023-09-20 PROCEDURE — 3008F BODY MASS INDEX DOCD: CPT | Mod: CPTII,,, | Performed by: NURSE PRACTITIONER

## 2023-09-20 PROCEDURE — 3008F PR BODY MASS INDEX (BMI) DOCUMENTED: ICD-10-PCS | Mod: CPTII,,, | Performed by: NURSE PRACTITIONER

## 2023-09-20 PROCEDURE — 99214 OFFICE O/P EST MOD 30 MIN: CPT | Mod: S$PBB,,, | Performed by: NURSE PRACTITIONER

## 2023-09-20 PROCEDURE — 1160F PR REVIEW ALL MEDS BY PRESCRIBER/CLIN PHARMACIST DOCUMENTED: ICD-10-PCS | Mod: CPTII,,, | Performed by: NURSE PRACTITIONER

## 2023-09-20 PROCEDURE — 3079F DIAST BP 80-89 MM HG: CPT | Mod: CPTII,,, | Performed by: NURSE PRACTITIONER

## 2023-09-20 PROCEDURE — 1160F RVW MEDS BY RX/DR IN RCRD: CPT | Mod: CPTII,,, | Performed by: NURSE PRACTITIONER

## 2023-09-20 PROCEDURE — 3044F HG A1C LEVEL LT 7.0%: CPT | Mod: CPTII,,, | Performed by: NURSE PRACTITIONER

## 2023-09-20 PROCEDURE — 1159F PR MEDICATION LIST DOCUMENTED IN MEDICAL RECORD: ICD-10-PCS | Mod: CPTII,,, | Performed by: NURSE PRACTITIONER

## 2023-09-20 PROCEDURE — 1159F MED LIST DOCD IN RCRD: CPT | Mod: CPTII,,, | Performed by: NURSE PRACTITIONER

## 2023-09-20 PROCEDURE — 3074F SYST BP LT 130 MM HG: CPT | Mod: CPTII,,, | Performed by: NURSE PRACTITIONER

## 2023-09-20 PROCEDURE — 99215 OFFICE O/P EST HI 40 MIN: CPT | Mod: PBBFAC | Performed by: NURSE PRACTITIONER

## 2023-09-20 RX ORDER — METHYLPREDNISOLONE 4 MG/1
TABLET ORAL
Qty: 21 EACH | Refills: 0 | Status: SHIPPED | OUTPATIENT
Start: 2023-09-20 | End: 2023-10-11

## 2023-09-20 RX ORDER — TRIAMCINOLONE ACETONIDE 1 MG/G
OINTMENT TOPICAL 2 TIMES DAILY
Qty: 30 G | Refills: 3 | Status: SHIPPED | OUTPATIENT
Start: 2023-09-20

## 2023-09-20 NOTE — PROGRESS NOTES
Internal Medicine Clinic  [facility]ATH[facility] Bowden     Patient Name: Patricia Guzman   : 2000  MRN:[id number]     Chief Complaint     Chief Complaint   Patient presents with    Follow-up     Recurrent generalize rash to lower ext        History of Present Illness     23 year old white female, presents in clinic for rash to heriberto lower extremities, trunk area. Sometimes itches, Aquaphor provides relief. Onset 3 months. Using otc hydrocortisone with very little relief. States similar rash noted a few yrs ago that was treated/resolved with RX steroids. Denies known allergies. Reports her mother has psoriasis. Denies joint pain/swelling or rash.    PMH Migraines, menorrhagia, constipation, external hemorrhoid, major depression. She is following Dr. Armani Doe for depression and is on Prozac 40 daily. Following [facility] clinic for migraines; on topiramate 50 mg once at bedtime and Sumatriptan prn.  Cycles are regular, lasting 6-7 days, described as heavy and painful. She was OCP from age [age]. Pt is sexually active, denies past pregnancy or STIs. Colonoscopy at age 15; diagnosed with abdominal migraines. States using OTC stimulants in order to produce BM. Tried on lactulose at prior apt with temporary relief.   Denies chest pain, shortness of breath, cough, fever, headache, dizziness, weakness, abdominal pain, nausea, vomiting, diarrhea, constipation, dysuria, depression, anxiety, SI, HI.  Scheduled with [facility] for GYN/pap smear/contraception 6/15/2023  Works at [employer], and studying Health Information Management           Review of Systems     Review of Systems   Constitutional: Negative.    HENT: Negative.     Eyes: Negative.    Respiratory: Negative.     Cardiovascular: Negative.    Gastrointestinal: Negative.    Endocrine: Negative.    Genitourinary: Negative.    Musculoskeletal: Negative.    Integumentary:  Positive for rash.   Allergic/Immunologic: Negative.    Neurological: Negative.   "  Hematological: Negative.    Psychiatric/Behavioral: Negative.     All other systems reviewed and are negative.       Physical Examination     Visit Vitals  /82 (BP Location: Left arm, Patient Position: Sitting, BP Method: Medium (Automatic))   Pulse 85   Temp 98.2 °F (36.8 °C) (Oral)   Resp 16   Ht 5' 3" (1.6 m)   Wt 72.6 kg (160 lb)   SpO2 97%   BMI 28.34 kg/m²        BP Readings from Last 6 Encounters:   09/20/23 124/82   06/12/23 104/71   05/18/23 104/70   02/07/23 (!) 95/59   12/20/22 115/75   12/07/22 108/71   ]    Wt Readings from Last 6 Encounters:   09/20/23 72.6 kg (160 lb)   06/12/23 71.5 kg (157 lb 9.6 oz)   05/18/23 69.5 kg (153 lb 3.2 oz)   02/07/23 66.3 kg (146 lb 0.9 oz)   12/20/22 66.7 kg (147 lb 1.6 oz)   12/07/22 67.2 kg (148 lb 3.2 oz)   ]      Physical Exam  Vitals and nursing note reviewed.   Constitutional:       Appearance: Normal appearance.   HENT:      Head: Normocephalic and atraumatic.      Right Ear: Tympanic membrane, ear canal and external ear normal.      Left Ear: Tympanic membrane, ear canal and external ear normal.      Nose: Nose normal.      Mouth/Throat:      Mouth: Mucous membranes are moist.      Pharynx: Oropharynx is clear.   Eyes:      Extraocular Movements: Extraocular movements intact.      Conjunctiva/sclera: Conjunctivae normal.      Pupils: Pupils are equal, round, and reactive to light.   Cardiovascular:      Rate and Rhythm: Normal rate and regular rhythm.      Pulses: Normal pulses.      Heart sounds: Normal heart sounds.   Pulmonary:      Effort: Pulmonary effort is normal.      Breath sounds: Normal breath sounds.   Abdominal:      General: Abdomen is flat. Bowel sounds are normal.      Palpations: Abdomen is soft.   Musculoskeletal:         General: Normal range of motion.      Cervical back: Normal range of motion and neck supple.   Skin:     General: Skin is warm and dry.      Capillary Refill: Capillary refill takes less than 2 seconds.      Findings: " Lesion and rash present.      Comments: See pictures taken in media. Round erythematic scaly rash to heriberto lower ext, and trunk.   Neurological:      General: No focal deficit present.      Mental Status: She is alert and oriented to person, place, and time. Mental status is at baseline.   Psychiatric:         Mood and Affect: Mood normal.         Behavior: Behavior normal.         Thought Content: Thought content normal.         Judgment: Judgment normal.          Labs / Imaging     Chemistry:  Lab Results   Component Value Date     06/09/2023     11/23/2015    K 4.2 06/09/2023    K 4.3 11/23/2015    CHLORIDE 106 06/09/2023    BUN 11.1 06/09/2023    BUN 10 11/23/2015    CREATININE 0.84 06/09/2023    CREATININE 0.9 11/23/2015    EGFRNORACEVR >60 06/09/2023    GLUCOSE 96 06/09/2023    CALCIUM 9.3 06/09/2023    CALCIUM 9.5 11/23/2015    ALKPHOS 50 06/09/2023    ALKPHOS 73 (L) 11/23/2015    LABPROT 7.4 06/09/2023    ALBUMIN 4.1 06/09/2023    ALBUMIN 3.7 11/23/2015    AST 14 06/09/2023    AST 15 11/23/2015    ALT 10 06/09/2023    ALT 8 (L) 11/23/2015    BXDKQRHT49EF 46.1 06/09/2023        Lab Results   Component Value Date    HGBA1C 4.8 06/09/2023        Hematology:  Lab Results   Component Value Date    WBC 5.92 06/09/2023    WBC 5.83 11/23/2015    RBC 4.34 06/09/2023    RBC 4.08 (L) 11/23/2015    HGB 13.0 06/09/2023    HGB 11.7 (L) 11/23/2015    HCT 40.0 06/09/2023    HCT 35.3 (L) 11/23/2015    MCV 92.2 06/09/2023    MCV 87 11/23/2015    MCH 30.0 06/09/2023    MCH 28.7 11/23/2015    MCHC 32.5 (L) 06/09/2023    MCHC 33.1 11/23/2015    RDW 12.9 06/09/2023    RDW 13.8 11/23/2015     06/09/2023     11/23/2015    MPV 11.6 (H) 06/09/2023    MPV 12.2 11/23/2015    GRAN 3.6 11/23/2015    GRAN 62.2 (H) 11/23/2015    LYMPH 1.5 11/23/2015    LYMPH 25.6 (L) 11/23/2015    MONO 0.6 11/23/2015    MONO 10.3 11/23/2015    EOS 0.1 11/23/2015    BASO 0.03 11/23/2015    EOSINOPHIL 1.4 11/23/2015    BASOPHIL 0.5  11/23/2015        Lipid Panel:  Lab Results   Component Value Date    CHOL 165 06/09/2023    HDL 45 06/09/2023    LDL 91.00 06/09/2023    TRIG 147 (H) 06/09/2023    TOTALCHOLEST 4 06/09/2023        Urine:  Lab Results   Component Value Date    COLORUA Light-Yellow 06/09/2023    APPEARANCEUA Clear 06/09/2023    SGUA 1.020 06/09/2023    PHUA 6.0 06/09/2023    PROTEINUA Negative 06/09/2023    GLUCOSEUA Normal 06/09/2023    KETONESUA Negative 06/09/2023    BLOODUA 1+ (A) 06/09/2023    NITRITESUA Negative 06/09/2023    LEUKOCYTESUR Negative 06/09/2023    RBCUA 0-5 06/09/2023    WBCUA 0-5 06/09/2023    BACTERIA None Seen 06/09/2023    SQEPUA Occ (A) 06/09/2023    HYALINECASTS None Seen 06/09/2023          Assessment       ICD-10-CM ICD-9-CM   1. Rash  R21 782.1   2. BMI 28.0-28.9,adult  Z68.28 V85.24        Plan     1. Rash  RX MEDROL raheem  RX triamcinolone topical ointment  Aquaphor  If no improvement will refer for skin sample, FM minor procedure clinic  - methylPREDNISolone (MEDROL DOSEPACK) 4 mg tablet; use as directed  Dispense: 21 each; Refill: 0    2. BMI 28.0-28.9,adult  Goal BMI <30.  Exercise 5 times a week for 30 minutes per day.  Avoid soda, simple sugars, excessive rice, potatoes or bread. Limit fast foods and fried foods.  Choose complex carbs in moderation (example: green vegetables, beans, oatmeal). Eat plenty of fresh fruits and vegetables with lean meats daily.  Do not skip meals. Eat a balanced portion size.  Avoid fad diets. Consider permanent healthy life style changes.           Current Outpatient Medications   Medication Instructions    CASCARA SAGMILTON ORAL 450 mg, Oral, Daily    FLUoxetine 40 mg, Oral, Daily    lactulose 10 gram/15 ml (CHRONULAC) 10 g, Oral, 2 times daily PRN    linaCLOtide (LINZESS) 145 mcg, Oral, Before breakfast    methylPREDNISolone (MEDROL DOSEPACK) 4 mg tablet use as directed    ondansetron (ZOFRAN ODT ORAL) Oral, prn    sumatriptan (IMITREX) 100 mg, Oral, 2 times daily PRN     topiramate (TOPAMAX) 50 mg, Oral, Nightly    triamcinolone acetonide 0.1% (KENALOG) 0.1 % ointment Topical (Top), 2 times daily       No orders of the defined types were placed in this encounter.        Future Appointments   Date Time Provider Department Center   6/12/2024  8:00 AM Annemarie Fischer, [facility]        Follow up if symptoms worsen or fail to improve.      RTC prn and as scheduled JUNE, with labs 1 week prior to the apt.  COVID 19 precautions given to patient.  Patient voices understanding of all discharge instructions.      JHONATHAN Bowden

## 2023-10-16 ENCOUNTER — PATIENT MESSAGE (OUTPATIENT)
Dept: ADMINISTRATIVE | Facility: HOSPITAL | Age: 23
End: 2023-10-16
Payer: MEDICAID

## 2023-12-07 DIAGNOSIS — G43.001 MIGRAINE WITHOUT AURA AND WITH STATUS MIGRAINOSUS, NOT INTRACTABLE: ICD-10-CM

## 2023-12-07 DIAGNOSIS — F33.41 DEPRESSION, MAJOR, RECURRENT, IN PARTIAL REMISSION: ICD-10-CM

## 2023-12-07 RX ORDER — SUMATRIPTAN SUCCINATE 100 MG/1
100 TABLET ORAL 2 TIMES DAILY PRN
Qty: 9 TABLET | Refills: 4 | Status: SHIPPED | OUTPATIENT
Start: 2023-12-07 | End: 2024-01-06

## 2023-12-12 ENCOUNTER — OFFICE VISIT (OUTPATIENT)
Dept: PSYCHIATRY | Facility: CLINIC | Age: 23
End: 2023-12-12
Payer: MEDICAID

## 2023-12-12 ENCOUNTER — PATIENT MESSAGE (OUTPATIENT)
Dept: PSYCHIATRY | Facility: CLINIC | Age: 23
End: 2023-12-12

## 2023-12-12 DIAGNOSIS — F40.10 SOCIAL ANXIETY DISORDER: ICD-10-CM

## 2023-12-12 DIAGNOSIS — F41.1 GAD (GENERALIZED ANXIETY DISORDER): Primary | ICD-10-CM

## 2023-12-12 DIAGNOSIS — T88.7XXA SIDE EFFECT OF MEDICATION: ICD-10-CM

## 2023-12-12 DIAGNOSIS — F41.0 PANIC DISORDER WITHOUT AGORAPHOBIA WITH MODERATE PANIC ATTACKS: ICD-10-CM

## 2023-12-12 DIAGNOSIS — F33.41 RECURRENT MAJOR DEPRESSION IN PARTIAL REMISSION: ICD-10-CM

## 2023-12-12 PROCEDURE — 3044F PR MOST RECENT HEMOGLOBIN A1C LEVEL <7.0%: ICD-10-PCS | Mod: CPTII,95,, | Performed by: PSYCHIATRY & NEUROLOGY

## 2023-12-12 PROCEDURE — 99214 OFFICE O/P EST MOD 30 MIN: CPT | Mod: AF,HB,95, | Performed by: PSYCHIATRY & NEUROLOGY

## 2023-12-12 PROCEDURE — 99214 PR OFFICE/OUTPT VISIT, EST, LEVL IV, 30-39 MIN: ICD-10-PCS | Mod: AF,HB,95, | Performed by: PSYCHIATRY & NEUROLOGY

## 2023-12-12 PROCEDURE — 3044F HG A1C LEVEL LT 7.0%: CPT | Mod: CPTII,95,, | Performed by: PSYCHIATRY & NEUROLOGY

## 2023-12-12 RX ORDER — FLUOXETINE HYDROCHLORIDE 40 MG/1
40 CAPSULE ORAL DAILY
Qty: 90 CAPSULE | Refills: 0 | Status: SHIPPED | OUTPATIENT
Start: 2023-12-12 | End: 2024-03-06 | Stop reason: SDUPTHER

## 2023-12-12 RX ORDER — BUPROPION HYDROCHLORIDE 150 MG/1
150 TABLET ORAL DAILY
Qty: 90 TABLET | Refills: 0 | Status: SHIPPED | OUTPATIENT
Start: 2023-12-12 | End: 2024-03-06 | Stop reason: SDUPTHER

## 2023-12-12 NOTE — PROGRESS NOTES
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 in 2025 academic year majoring in cancer registry management  School: [employer]  Lives with:(now in an apartment)     The patient location is: [place]  The chief complaint leading to consultation is: anxiety, new problem of inattention    Visit type: audiovisual    Face to Face time with patient: 30 minutes  30 minutes of total time spent on the encounter, which includes face to face time and non-face to face time preparing to see the patient (e.g., review of tests), Obtaining and/or reviewing separately obtained history, Documenting clinical information in the electronic or other health record, Independently interpreting results (not separately reported) and communicating results to the patient/family/caregiver, or Care coordination (not separately reported).     Each patient to whom he or she provides medical services by telemedicine is:  (1) informed of the relationship between the physician and patient and the respective role of any other health care provider with respect to management of the patient; and (2) notified that he or she may decline to receive medical services by telemedicine and may withdraw from such care at any time.    Notes:       Chief Complaint: Patricia Guzman is a 23 year old female who presents today for follow-up medication management of depression and anxiety. Met with patient.    "I have a lot going on with my Mom. She has stopped taking her medication which is not a good thing and it has been rough trying to communicate with her."    Interval History and Content of Current Session:  Interim Events/Subjective Report/Content of Current Session:     Recently changed her major to cancer " "registry management. Marina had been living with her boyfriend.    "My mom is saying that I am not super supportive of her."    "I have a medicaid. I want to try to get talk therapist."    "I dropped out of school. I am working full time. I am planning on going back but not next semester."    "I want to try Wellbutrin and add it onto my Prozac."    "She won't talk to me about it. I find it so odd that she stopped. She won't talk about it."    "I am OK with it."    "I don't want to go on Abilify again."    Previously discussed Trintellix.    No SI  No cutting  Medical history: abdominal migraines    No surgical history  Current medications:    Vistaril  OCP-discontinued  Hyoscyamine-discontinued    Psychotherapy:  Target symptoms: anxiety , adjustment, difficulty with school  Why chosen therapy is appropriate versus another modality: relevant to diagnosis, patient responds to this modality, evidence based practice  Outcome monitoring methods: self-report, observation  Therapeutic intervention type: insight oriented psychotherapy, behavior modifying psychotherapy, supportive psychotherapy  Topics discussed/themes: relationships difficulties, difficulty managing affect in interpersonal relationships, building skills sets for symptom management, symptom recognition  The patient's response to the intervention is accepting. The patient's progress toward treatment goals is good.   Duration of intervention: 0 minutes.    Review of Systems   PSYCHIATRIC: Pertinent items are noted in the narrative.  CONSTITUTIONAL: No weight gain or loss.   MUSCULOSKELETAL: No pain or stiffness of the joints.  NEUROLOGIC: No weakness, sensory changes, seizures, confusion, memory loss, tremor or other abnormal movements.  CARDIOVASCULAR: No tachycardia or chest pain.  GASTROINTESTINAL:  No nausea, vomiting, pain, constipation or diarrhea. Patient has a history of abdominal migraines at least once per day.     Past Medical, Family and Social " "History: The patient's past medical, family and social history have been reviewed and updated as appropriate within the electronic medical record - see encounter notes. Parents are . 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 Not doing well in the program and taking biology online this summer. 2020-21 academic year has been more of a challenge. 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     Compliance: yes     Side effects: None     Risk Parameters:  Patient reports no suicidal ideation  Patient reports no homicidal ideation  Patient reports no self-injurious behavior  Patient reports no violent behavior    Wt Readings from Last 3 Encounters:   09/20/23 72.6 kg (160 lb)   06/12/23 71.5 kg (157 lb 9.6 oz)   05/18/23 69.5 kg (153 lb 3.2 oz)     Temp Readings from Last 3 Encounters:   09/20/23 98.2 °F (36.8 °C) (Oral)   06/12/23 98.1 °F (36.7 °C) (Oral)   05/18/23 97.6 °F (36.4 °C) (Oral)     BP Readings from Last 3 Encounters:   09/20/23 124/82   06/12/23 104/71   05/18/23 104/70     Pulse Readings from Last 3 Encounters:   09/20/23 85   06/12/23 90   05/18/23 88       Exam (detailed: at least 9 elements; comprehensive: all 15 elements)   Constitutional  Vitals:  No vitals taken today     General:  unremarkable, age appropriate, casually dressed, pleasant and cooperative and funny and easy to engage     Musculoskeletal  Muscle Strength/Tone:  no dyskinesia, no tremor, no tic   Gait & Station:  non-ataxic      Psychiatric  Speech:  no latency; no press, spontaneous   Mood & Affect:  Euthymic "I am good really good."  congruent and appropriate   Thought Process:  normal and logical   Associations:  intact   Thought Content:  normal, no suicidality, no homicidality, delusions, or paranoia   Insight:  intact   Judgement: behavior is adequate to circumstances   Orientation:  Alert and oriented to " person place time date and situation   Memory: intact for content of interview   Language: Able to name and repeat   Attention Span & Concentration:  able to focus, completed tasks   Fund of Knowledge:  intact and appropriate to age and level of education      Assessment and Diagnosis   Status/Progress: Based on the examination today, the patient's problem(s) is/are adequately controlled.New problems have not been presented today.  Diagnostic uncertainty and Lack of compliance are not complicating management of the primary condition. There are no active rule-out diagnoses for this patient at this time.      General Impression: 23 year old female with anxiety and depression improved significantly on Prozac daily. Newer complaint of decreased libido was improved by lowering Prozac but her anxiety/panic returned.       ICD-10-CM ICD-9-CM   1. Recurrent Major Depression in Partial Remission F33.41    2. SANDHYA (generalized anxiety disorder) F41.1 300.02   3. Social anxiety disorder F40.10 300.23   4. Panic disorder without agoraphobia with moderate panic attacks F41.0 300.01   5.      Side effect of a medication    R/O ADHD inattentive type  Intervention/Counseling/Treatment Plan   Medication Management: Prozac to 40 mg daily. The risks and benefits of medication were discussed with the patient.  Try to augment Prozac with Wellbutrin  mg daily to see if it could restore her libido as well as improve her depression. Insurance previously denied it     Consider Trintellix-would need PA and she has only ever been on a single SSRI-Prozac    Counseling provided with patient and caregiver as follows: importance of compliance with chosen treatment options was emphasized, risks and benefits of treatment options, including medications, were discussed with the patient      Return to Clinic: 6 months

## 2024-03-06 ENCOUNTER — OFFICE VISIT (OUTPATIENT)
Dept: PSYCHIATRY | Facility: CLINIC | Age: 24
End: 2024-03-06
Payer: COMMERCIAL

## 2024-03-06 DIAGNOSIS — F41.1 GAD (GENERALIZED ANXIETY DISORDER): ICD-10-CM

## 2024-03-06 DIAGNOSIS — F33.41 RECURRENT MAJOR DEPRESSION IN PARTIAL REMISSION: Primary | ICD-10-CM

## 2024-03-06 DIAGNOSIS — F40.10 SOCIAL ANXIETY DISORDER: ICD-10-CM

## 2024-03-06 DIAGNOSIS — T88.7XXA SIDE EFFECT OF MEDICATION: ICD-10-CM

## 2024-03-06 DIAGNOSIS — F41.0 PANIC DISORDER WITHOUT AGORAPHOBIA WITH MODERATE PANIC ATTACKS: ICD-10-CM

## 2024-03-06 PROCEDURE — 99214 OFFICE O/P EST MOD 30 MIN: CPT | Mod: 95,,, | Performed by: PSYCHIATRY & NEUROLOGY

## 2024-03-06 RX ORDER — FLUOXETINE HYDROCHLORIDE 40 MG/1
40 CAPSULE ORAL DAILY
Qty: 90 CAPSULE | Refills: 0 | Status: SHIPPED | OUTPATIENT
Start: 2024-03-06 | End: 2024-06-12

## 2024-03-06 RX ORDER — BUPROPION HYDROCHLORIDE 150 MG/1
150 TABLET ORAL DAILY
Qty: 90 TABLET | Refills: 0 | Status: SHIPPED | OUTPATIENT
Start: 2024-03-06 | End: 2024-06-03

## 2024-03-06 NOTE — PROGRESS NOTES
"Outpatient Psychiatry Follow-Up Visit (MD/NP)    3/6/2024    Last appointment:12/12/2023    Missed appointment:  June 21, 2021 July 2, 2021    Clinical Status of Patient:  Outpatient (Ambulatory)    IDENTIFYING DATA:    Child's Name: Patrciia Dillon"  Grade: 5 th year in college 2023-24 and will graduate in 2025 academic year majoring in cancer registry management  School: [employer]  Lives with:(now in an apartment)     The patient location is: [place]  The chief complaint leading to consultation is: anxiety, new problem of inattention    Visit type: audiovisual    Face to Face time with patient: 30 minutes  30 minutes of total time spent on the encounter, which includes face to face time and non-face to face time preparing to see the patient (e.g., review of tests), Obtaining and/or reviewing separately obtained history, Documenting clinical information in the electronic or other health record, Independently interpreting results (not separately reported) and communicating results to the patient/family/caregiver, or Care coordination (not separately reported).     Each patient to whom he or she provides medical services by telemedicine is:  (1) informed of the relationship between the physician and patient and the respective role of any other health care provider with respect to management of the patient; and (2) notified that he or she may decline to receive medical services by telemedicine and may withdraw from such care at any time.    Notes:       Chief Complaint: Patricia Guzman is a 23 year old female who presents today for follow-up medication management of depression and anxiety. Met with patient.    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 I have chris really busy."    Interval History and Content of Current Session:  Interim Events/Subjective Report/Content of Current Session:     Recently changed her major to cancer registry management. "I am back in school."    "I think " "the Wellbutrin is working and I don't feel like I need a nap in the middle of the day. I have some energy so that is good."    "I don't think I have any problems with the Wellbutrin XL."    "My friend got  and my Mom wasn't able to come because of her mental state."    "She doesn't really want my help."    "I got new insurance since I make too much money and I get it through my job now. Work is busy and my mom is still having her issues. I am learning to balance things."    "I am learning to balance work and my free time."    "I am back in school and I just need 3 classes and I am only taking one class this semester in English in technical writing and it is requirement. I am just so close to graduation."    Marina is working full time.     Previously discussed Trintellix.    No SI  No cutting  Medical history: abdominal migraines    No surgical history  Current medications:    Vistaril  OCP-discontinued  Hyoscyamine-discontinued    Psychotherapy:  Target symptoms: anxiety , adjustment, difficulty with school  Why chosen therapy is appropriate versus another modality: relevant to diagnosis, patient responds to this modality, evidence based practice  Outcome monitoring methods: self-report, observation  Therapeutic intervention type: insight oriented psychotherapy, behavior modifying psychotherapy, supportive psychotherapy  Topics discussed/themes: relationships difficulties, difficulty managing affect in interpersonal relationships, building skills sets for symptom management, symptom recognition  The patient's response to the intervention is accepting. The patient's progress toward treatment goals is good.   Duration of intervention: 0 minutes.    Review of Systems   PSYCHIATRIC: Pertinent items are noted in the narrative.  CONSTITUTIONAL: No weight gain or loss.   MUSCULOSKELETAL: No pain or stiffness of the joints.  NEUROLOGIC: No weakness, sensory changes, seizures, confusion, memory loss, tremor or other " "abnormal movements.  CARDIOVASCULAR: No tachycardia or chest pain.  GASTROINTESTINAL:  No nausea, vomiting, pain, constipation or diarrhea. Patient has a history of abdominal migraines at least once per day.     Past Medical, Family and Social History: The patient's past medical, family and social history have been reviewed and updated as appropriate within the electronic medical record - see encounter notes. Parents are . 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 Not doing well in the program and taking biology online this summer. 2020-21 academic year has been more of a challenge. 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  Marina has "taken a break" from school and is working full time.      Compliance: yes     Side effects: None     Risk Parameters:  Patient reports no suicidal ideation  Patient reports no homicidal ideation  Patient reports no self-injurious behavior  Patient reports no violent behavior    Wt Readings from Last 3 Encounters:   09/20/23 72.6 kg (160 lb)   06/12/23 71.5 kg (157 lb 9.6 oz)   05/18/23 69.5 kg (153 lb 3.2 oz)     Temp Readings from Last 3 Encounters:   09/20/23 98.2 °F (36.8 °C) (Oral)   06/12/23 98.1 °F (36.7 °C) (Oral)   05/18/23 97.6 °F (36.4 °C) (Oral)     BP Readings from Last 3 Encounters:   09/20/23 124/82   06/12/23 104/71   05/18/23 104/70     Pulse Readings from Last 3 Encounters:   09/20/23 85   06/12/23 90   05/18/23 88     Exam (detailed: at least 9 elements; comprehensive: all 15 elements)   Constitutional  Vitals:  No vitals taken today     General:  unremarkable, age appropriate, casually dressed, pleasant and cooperative and funny and easy to engage     Musculoskeletal  Muscle Strength/Tone:  no dyskinesia, no tremor, no tic   Gait & Station:  non-ataxic      Psychiatric  Speech:  no latency; no press, spontaneous   Mood & Affect:  Euthymic "I am " "good."  congruent and appropriate   Thought Process:  normal and logical   Associations:  intact   Thought Content:  normal, no suicidality, no homicidality, delusions, or paranoia   Insight:  intact   Judgement: behavior is adequate to circumstances   Orientation:  Alert and oriented to person place time date and situation   Memory: intact for content of interview   Language: Able to name and repeat   Attention Span & Concentration:  able to focus, completed tasks   Fund of Knowledge:  intact and appropriate to age and level of education      Assessment and Diagnosis   Status/Progress: Based on the examination today, the patient's problem(s) is/are adequately controlled.New problems have not been presented today.  Diagnostic uncertainty and Lack of compliance are not complicating management of the primary condition. There are no active rule-out diagnoses for this patient at this time.      General Impression: 23 year old female with anxiety and depression improved significantly on Prozac daily. Newer complaint of decreased libido was improved by lowering Prozac but her anxiety/panic returned.       ICD-10-CM ICD-9-CM   1. Recurrent Major Depression in Partial Remission F33.41    2. SANDHYA (generalized anxiety disorder) F41.1 300.02   3. Social anxiety disorder F40.10 300.23   4. Panic disorder without agoraphobia with moderate panic attacks F41.0 300.01   5.      Side effect of a medication    R/O ADHD inattentive type  Intervention/Counseling/Treatment Plan   Medication Management: Prozac to 40 mg daily. The risks and benefits of medication were discussed with the patient.  Try to augment Prozac with Wellbutrin  mg daily to see if it could restore her libido as well as improve her depression. Insurance previously denied it     Consider Trintellix-would need PA and she has only ever been on a single SSRI-Prozac    Counseling provided with patient and caregiver as follows: importance of compliance with chosen " treatment options was emphasized, risks and benefits of treatment options, including medications, were discussed with the patient      Return to Clinic: 6 months

## 2024-06-02 DIAGNOSIS — F33.41 RECURRENT MAJOR DEPRESSION IN PARTIAL REMISSION: ICD-10-CM

## 2024-06-03 RX ORDER — BUPROPION HYDROCHLORIDE 150 MG/1
150 TABLET ORAL
Qty: 90 TABLET | Refills: 0 | Status: SHIPPED | OUTPATIENT
Start: 2024-06-03

## 2024-06-12 ENCOUNTER — LAB VISIT (OUTPATIENT)
Dept: LAB | Facility: HOSPITAL | Age: 24
End: 2024-06-12
Attending: NURSE PRACTITIONER
Payer: COMMERCIAL

## 2024-06-12 ENCOUNTER — TELEPHONE (OUTPATIENT)
Dept: INTERNAL MEDICINE | Facility: CLINIC | Age: 24
End: 2024-06-12
Payer: COMMERCIAL

## 2024-06-12 ENCOUNTER — OFFICE VISIT (OUTPATIENT)
Dept: INTERNAL MEDICINE | Facility: CLINIC | Age: 24
End: 2024-06-12
Payer: COMMERCIAL

## 2024-06-12 VITALS
BODY MASS INDEX: 28.9 KG/M2 | SYSTOLIC BLOOD PRESSURE: 100 MMHG | HEART RATE: 80 BPM | WEIGHT: 163.13 LBS | DIASTOLIC BLOOD PRESSURE: 66 MMHG | HEIGHT: 63 IN | RESPIRATION RATE: 16 BRPM | TEMPERATURE: 98 F

## 2024-06-12 DIAGNOSIS — F33.41 DEPRESSION, MAJOR, RECURRENT, IN PARTIAL REMISSION: ICD-10-CM

## 2024-06-12 DIAGNOSIS — Z11.3 SCREEN FOR STD (SEXUALLY TRANSMITTED DISEASE): ICD-10-CM

## 2024-06-12 DIAGNOSIS — G43.001 MIGRAINE WITHOUT AURA AND WITH STATUS MIGRAINOSUS, NOT INTRACTABLE: ICD-10-CM

## 2024-06-12 DIAGNOSIS — Z00.00 WELLNESS EXAMINATION: ICD-10-CM

## 2024-06-12 DIAGNOSIS — K59.04 CHRONIC IDIOPATHIC CONSTIPATION: ICD-10-CM

## 2024-06-12 LAB
25(OH)D3+25(OH)D2 SERPL-MCNC: 45 NG/ML (ref 30–80)
ALBUMIN SERPL-MCNC: 4 G/DL (ref 3.5–5)
ALBUMIN/GLOB SERPL: 1.2 RATIO (ref 1.1–2)
ALP SERPL-CCNC: 56 UNIT/L (ref 40–150)
ALT SERPL-CCNC: 15 UNIT/L (ref 0–55)
ANION GAP SERPL CALC-SCNC: 7 MEQ/L
AST SERPL-CCNC: 19 UNIT/L (ref 5–34)
B-HCG UR QL: NEGATIVE
BACTERIA #/AREA URNS AUTO: ABNORMAL /HPF
BASOPHILS # BLD AUTO: 0.03 X10(3)/MCL
BASOPHILS NFR BLD AUTO: 0.5 %
BILIRUB SERPL-MCNC: 0.5 MG/DL
BILIRUB UR QL STRIP.AUTO: NEGATIVE
BUN SERPL-MCNC: 8.2 MG/DL (ref 7–18.7)
C TRACH DNA SPEC QL NAA+PROBE: NOT DETECTED
CALCIUM SERPL-MCNC: 9.8 MG/DL (ref 8.4–10.2)
CHLORIDE SERPL-SCNC: 108 MMOL/L (ref 98–107)
CHOLEST SERPL-MCNC: 165 MG/DL
CHOLEST/HDLC SERPL: 4 {RATIO} (ref 0–5)
CLARITY UR: CLEAR
CO2 SERPL-SCNC: 25 MMOL/L (ref 22–29)
COLOR UR AUTO: YELLOW
CREAT SERPL-MCNC: 0.92 MG/DL (ref 0.55–1.02)
CREAT/UREA NIT SERPL: 9
EOSINOPHIL # BLD AUTO: 0.12 X10(3)/MCL (ref 0–0.9)
EOSINOPHIL NFR BLD AUTO: 1.8 %
ERYTHROCYTE [DISTWIDTH] IN BLOOD BY AUTOMATED COUNT: 12.1 % (ref 11.5–17)
GFR SERPLBLD CREATININE-BSD FMLA CKD-EPI: >60 ML/MIN/1.73/M2
GLOBULIN SER-MCNC: 3.3 GM/DL (ref 2.4–3.5)
GLUCOSE SERPL-MCNC: 88 MG/DL (ref 74–100)
GLUCOSE UR QL STRIP: NORMAL
HCT VFR BLD AUTO: 41.7 % (ref 37–47)
HCV AB SERPL QL IA: NONREACTIVE
HDLC SERPL-MCNC: 43 MG/DL (ref 35–60)
HGB BLD-MCNC: 13.9 G/DL (ref 12–16)
HGB UR QL STRIP: NEGATIVE
HIV 1+2 AB+HIV1 P24 AG SERPL QL IA: NONREACTIVE
HYALINE CASTS #/AREA URNS LPF: ABNORMAL /LPF
IMM GRANULOCYTES # BLD AUTO: 0.02 X10(3)/MCL (ref 0–0.04)
IMM GRANULOCYTES NFR BLD AUTO: 0.3 %
KETONES UR QL STRIP: NEGATIVE
LDLC SERPL CALC-MCNC: 103 MG/DL (ref 50–140)
LEUKOCYTE ESTERASE UR QL STRIP: NEGATIVE
LYMPHOCYTES # BLD AUTO: 1.8 X10(3)/MCL (ref 0.6–4.6)
LYMPHOCYTES NFR BLD AUTO: 27 %
MCH RBC QN AUTO: 31.4 PG (ref 27–31)
MCHC RBC AUTO-ENTMCNC: 33.3 G/DL (ref 33–36)
MCV RBC AUTO: 94.1 FL (ref 80–94)
MONOCYTES # BLD AUTO: 0.39 X10(3)/MCL (ref 0.1–1.3)
MONOCYTES NFR BLD AUTO: 5.9 %
MUCOUS THREADS URNS QL MICRO: ABNORMAL /LPF
N GONORRHOEA DNA SPEC QL NAA+PROBE: NOT DETECTED
NEUTROPHILS # BLD AUTO: 4.3 X10(3)/MCL (ref 2.1–9.2)
NEUTROPHILS NFR BLD AUTO: 64.5 %
NITRITE UR QL STRIP: NEGATIVE
NRBC BLD AUTO-RTO: 0 %
PH UR STRIP: 7 [PH]
PLATELET # BLD AUTO: 211 X10(3)/MCL (ref 130–400)
PMV BLD AUTO: 11.6 FL (ref 7.4–10.4)
POTASSIUM SERPL-SCNC: 4.2 MMOL/L (ref 3.5–5.1)
PROT SERPL-MCNC: 7.3 GM/DL (ref 6.4–8.3)
PROT UR QL STRIP: ABNORMAL
RBC # BLD AUTO: 4.43 X10(6)/MCL (ref 4.2–5.4)
RBC #/AREA URNS AUTO: ABNORMAL /HPF
SODIUM SERPL-SCNC: 140 MMOL/L (ref 136–145)
SOURCE (OHS): NORMAL
SP GR UR STRIP.AUTO: 1.02 (ref 1–1.03)
SQUAMOUS #/AREA URNS LPF: ABNORMAL /HPF
T PALLIDUM AB SER QL: NONREACTIVE
TRIGL SERPL-MCNC: 97 MG/DL (ref 37–140)
TSH SERPL-ACNC: 0.86 UIU/ML (ref 0.35–4.94)
UROBILINOGEN UR STRIP-ACNC: NORMAL
VLDLC SERPL CALC-MCNC: 19 MG/DL
WBC # SPEC AUTO: 6.66 X10(3)/MCL (ref 4.5–11.5)
WBC #/AREA URNS AUTO: ABNORMAL /HPF

## 2024-06-12 PROCEDURE — 80061 LIPID PANEL: CPT

## 2024-06-12 PROCEDURE — 99395 PREV VISIT EST AGE 18-39: CPT | Mod: S$PBB,,, | Performed by: NURSE PRACTITIONER

## 2024-06-12 PROCEDURE — 81025 URINE PREGNANCY TEST: CPT

## 2024-06-12 PROCEDURE — 1159F MED LIST DOCD IN RCRD: CPT | Mod: CPTII,,, | Performed by: NURSE PRACTITIONER

## 2024-06-12 PROCEDURE — 36415 COLL VENOUS BLD VENIPUNCTURE: CPT

## 2024-06-12 PROCEDURE — 99214 OFFICE O/P EST MOD 30 MIN: CPT | Mod: PBBFAC | Performed by: NURSE PRACTITIONER

## 2024-06-12 PROCEDURE — 81003 URINALYSIS AUTO W/O SCOPE: CPT

## 2024-06-12 PROCEDURE — 1160F RVW MEDS BY RX/DR IN RCRD: CPT | Mod: CPTII,,, | Performed by: NURSE PRACTITIONER

## 2024-06-12 PROCEDURE — 3074F SYST BP LT 130 MM HG: CPT | Mod: CPTII,,, | Performed by: NURSE PRACTITIONER

## 2024-06-12 PROCEDURE — 87491 CHLMYD TRACH DNA AMP PROBE: CPT

## 2024-06-12 PROCEDURE — 86780 TREPONEMA PALLIDUM: CPT

## 2024-06-12 PROCEDURE — 85025 COMPLETE CBC W/AUTO DIFF WBC: CPT

## 2024-06-12 PROCEDURE — 82306 VITAMIN D 25 HYDROXY: CPT

## 2024-06-12 PROCEDURE — 80053 COMPREHEN METABOLIC PANEL: CPT

## 2024-06-12 PROCEDURE — 87389 HIV-1 AG W/HIV-1&-2 AB AG IA: CPT

## 2024-06-12 PROCEDURE — 86803 HEPATITIS C AB TEST: CPT

## 2024-06-12 PROCEDURE — 3078F DIAST BP <80 MM HG: CPT | Mod: CPTII,,, | Performed by: NURSE PRACTITIONER

## 2024-06-12 PROCEDURE — 84443 ASSAY THYROID STIM HORMONE: CPT

## 2024-06-12 PROCEDURE — 3008F BODY MASS INDEX DOCD: CPT | Mod: CPTII,,, | Performed by: NURSE PRACTITIONER

## 2024-06-12 RX ORDER — TRIAMCINOLONE ACETONIDE 1 MG/G
OINTMENT TOPICAL 2 TIMES DAILY
Qty: 30 G | Refills: 3 | Status: SHIPPED | OUTPATIENT
Start: 2024-06-12

## 2024-06-12 RX ORDER — SUMATRIPTAN SUCCINATE 100 MG/1
100 TABLET ORAL 2 TIMES DAILY PRN
Qty: 9 TABLET | Refills: 4 | Status: SHIPPED | OUTPATIENT
Start: 2024-06-12 | End: 2024-07-12

## 2024-06-12 RX ORDER — AMOXICILLIN 250 MG
1 CAPSULE ORAL
COMMUNITY

## 2024-06-12 NOTE — PROGRESS NOTES
Internal Medicine Clinic  JHONATHAN Bowden     Patient Name: Patricia Guzman   : 2000  MRN:[id number]     Chief Complaint     Chief Complaint   Patient presents with    Follow-up     Wellness        History of Present Illness     24 year old white female, presents in clinic for routine wellness. Will do labs after apt.    Chronic rash to heriberto lower extremities, trunk area. Sometimes itches, Aquaphor provides relief.  Denies known allergies. Reports her mother has psoriasis. Denies joint pain/swelling or rash.     PMH Migraines, menorrhagia, constipation, external hemorrhoid, major depression. She is following Dr. Armani Doe for depression and is on Prozac 40 daily, and wellbutrin XL 150mg daily. Previously following [facility] clinic for migraines; no longer on topiramate 50 mg but would like a refill on Sumatriptan prn.  Following health unit in [place] for pap smear, now has a left upper arm BC implant. Pt is sexually active, denies past pregnancy or STIs. Colonoscopy at age 15; diagnosed with abdominal migraines. States using OTC stimulants in order to produce BM. Tried on lactulose at prior apt with temporary relief. States Linzess was effective but can not afford currently.  Denies chest pain, shortness of breath, cough, fever, headache, dizziness, weakness, abdominal pain, nausea, vomiting, diarrhea, constipation, dysuria, depression, anxiety, SI, HI.    Works at "[employer] and studying General Studies.                 Review of Systems     Review of Systems   Constitutional: Negative.    HENT: Negative.     Eyes: Negative.    Respiratory: Negative.     Cardiovascular: Negative.    Gastrointestinal: Negative.    Endocrine: Negative.    Genitourinary: Negative.    Musculoskeletal: Negative.    Integumentary:  Negative.   Allergic/Immunologic: Negative.    Neurological: Negative.    Hematological: Negative.    Psychiatric/Behavioral: Negative.     All other systems reviewed and are negative.    "    Physical Examination     Visit Vitals  /66 (BP Location: Left arm, Patient Position: Sitting, BP Method: Medium (Automatic))   Pulse 80   Temp 97.9 °F (36.6 °C) (Oral)   Resp 16   Ht 5' 3" (1.6 m)   Wt 74 kg (163 lb 2.3 oz)   BMI 28.90 kg/m²        BP Readings from Last 6 Encounters:   06/12/24 100/66   09/20/23 124/82   06/12/23 104/71   05/18/23 104/70   02/07/23 (!) 95/59   12/20/22 115/75   ]    Wt Readings from Last 6 Encounters:   06/12/24 74 kg (163 lb 2.3 oz)   09/20/23 72.6 kg (160 lb)   06/12/23 71.5 kg (157 lb 9.6 oz)   05/18/23 69.5 kg (153 lb 3.2 oz)   02/07/23 66.3 kg (146 lb 0.9 oz)   12/20/22 66.7 kg (147 lb 1.6 oz)   ]    BMI Readings from Last 3 Encounters:   06/12/24 28.90 kg/m²   09/20/23 28.34 kg/m²   06/12/23 27.92 kg/m²         Physical Exam  Vitals and nursing note reviewed.   Constitutional:       Appearance: Normal appearance.   HENT:      Head: Normocephalic and atraumatic.      Right Ear: Tympanic membrane, ear canal and external ear normal.      Left Ear: Tympanic membrane, ear canal and external ear normal.      Nose: Nose normal.      Mouth/Throat:      Mouth: Mucous membranes are moist.      Pharynx: Oropharynx is clear.   Eyes:      Extraocular Movements: Extraocular movements intact.      Conjunctiva/sclera: Conjunctivae normal.      Pupils: Pupils are equal, round, and reactive to light.   Cardiovascular:      Rate and Rhythm: Normal rate and regular rhythm.      Pulses: Normal pulses.      Heart sounds: Normal heart sounds.   Pulmonary:      Effort: Pulmonary effort is normal.      Breath sounds: Normal breath sounds.   Abdominal:      General: Abdomen is flat. Bowel sounds are normal.      Palpations: Abdomen is soft.   Musculoskeletal:         General: Normal range of motion.      Cervical back: Normal range of motion and neck supple.   Skin:     General: Skin is warm and dry.      Capillary Refill: Capillary refill takes less than 2 seconds.   Neurological:      " General: No focal deficit present.      Mental Status: She is alert and oriented to person, place, and time. Mental status is at baseline.   Psychiatric:         Mood and Affect: Mood normal.         Behavior: Behavior normal.         Thought Content: Thought content normal.         Judgment: Judgment normal.          Labs / Imaging     Chemistry:  Lab Results   Component Value Date     06/09/2023     11/23/2015    K 4.2 06/09/2023    K 4.3 11/23/2015    BUN 11.1 06/09/2023    BUN 10 11/23/2015    CREATININE 0.84 06/09/2023    CREATININE 0.9 11/23/2015    EGFRNORACEVR >60 06/09/2023    GLUCOSE 96 06/09/2023    CALCIUM 9.3 06/09/2023    CALCIUM 9.5 11/23/2015    ALKPHOS 50 06/09/2023    ALKPHOS 73 (L) 11/23/2015    LABPROT 7.4 06/09/2023    ALBUMIN 4.1 06/09/2023    ALBUMIN 3.7 11/23/2015    AST 14 06/09/2023    AST 15 11/23/2015    ALT 10 06/09/2023    ALT 8 (L) 11/23/2015    JSNZOXBT99KN 46.1 06/09/2023        Lab Results   Component Value Date    HGBA1C 4.8 06/09/2023        Hematology:  Lab Results   Component Value Date    WBC 5.92 06/09/2023    WBC 5.83 11/23/2015    RBC 4.34 06/09/2023    RBC 4.08 (L) 11/23/2015    HGB 13.0 06/09/2023    HGB 11.7 (L) 11/23/2015    HCT 40.0 06/09/2023    HCT 35.3 (L) 11/23/2015    MCV 92.2 06/09/2023    MCV 87 11/23/2015    MCH 30.0 06/09/2023    MCH 28.7 11/23/2015    MCHC 32.5 (L) 06/09/2023    MCHC 33.1 11/23/2015    RDW 12.9 06/09/2023    RDW 13.8 11/23/2015     06/09/2023     11/23/2015    MPV 11.6 (H) 06/09/2023    MPV 12.2 11/23/2015    GRAN 3.6 11/23/2015    GRAN 62.2 (H) 11/23/2015    LYMPH 1.5 11/23/2015    LYMPH 25.6 (L) 11/23/2015    MONO 0.6 11/23/2015    MONO 10.3 11/23/2015    EOS 0.1 11/23/2015    BASO 0.03 11/23/2015    EOSINOPHIL 1.4 11/23/2015    BASOPHIL 0.5 11/23/2015        Lipid Panel:  Lab Results   Component Value Date    CHOL 165 06/09/2023    HDL 45 06/09/2023    LDL 91.00 06/09/2023    TRIG 147 (H) 06/09/2023    TOTALCHOLEST  4 06/09/2023        Urine:  Lab Results   Component Value Date    APPEARANCEUA Clear 06/09/2023    SGUA 1.020 06/09/2023    PROTEINUA Negative 06/09/2023    KETONESUA Negative 06/09/2023    LEUKOCYTESUR Negative 06/09/2023    RBCUA 0-5 06/09/2023    WBCUA 0-5 06/09/2023    BACTERIA None Seen 06/09/2023    SQEPUA Occ (A) 06/09/2023    HYALINECASTS None Seen 06/09/2023          Assessment       ICD-10-CM ICD-9-CM   1. Wellness examination  Z00.00 V70.0   2. Screen for STD (sexually transmitted disease)  Z11.3 V74.5   3. BMI 28.0-28.9,adult  Z68.28 V85.24   4. Chronic idiopathic constipation  K59.04 564.00   5. Migraine without aura and with status migrainosus, not intractable  G43.001 346.12   6. Depression, major, recurrent, in partial remission  F33.41 296.35        Plan     1. Wellness examination  Avoid tobacco/ alcohol/ drugs  Regular exercise as tolerated  Healthy lifestyle habits   - CBC Auto Differential; Future  - Comprehensive Metabolic Panel; Future  - Lipid Panel; Future  - TSH; Future  - Urinalysis; Future  - Vitamin D; Future  - SYPHILIS ANTIBODY (WITH REFLEX RPR); Future  - Hepatitis C Antibody; Future  - HIV 1/2 Ag/Ab (4th Gen); Future  - Chlamydia/GC, PCR; Future  - HCG Qualitative Urine; Future    2. Screen for STD (sexually transmitted disease)    - Urinalysis; Future  - SYPHILIS ANTIBODY (WITH REFLEX RPR); Future  - Hepatitis C Antibody; Future  - HIV 1/2 Ag/Ab (4th Gen); Future  - Chlamydia/GC, PCR; Future  - HCG Qualitative Urine; Future    3. BMI 28.0-28.9,adult  Goal BMI <30.  Exercise 5 times a week for 30 minutes per day.  Avoid soda, simple sugars, excessive rice, potatoes or bread. Limit fast foods and fried foods.  Choose complex carbs in moderation (example: green vegetables, beans, oatmeal). Eat plenty of fresh fruits and vegetables with lean meats daily.  Do not skip meals. Eat a balanced portion size.  Avoid fad diets. Consider permanent healthy life style changes.       4. Chronic  idiopathic constipation  RX LINZESS; sent to [facility]  Increase dietary fiber and water.   Avoid straining or sitting on the toilet for long periods of time.  Try OTC fiber supplements and stool softeners.     5. Migraine without aura and with status migrainosus, not intractable  F/u NEURO prn  RX Imitrex prn  Headache Education Provided: Stressed importance of good sleep hygiene and stress management.   Medication Overuse Education Provided: Using more than 3 OTC medications per week may worsen headaches.  Lifestyle Modifications Discussed: High intensity interval training has shown to reduce headache frequency. Low carb, high protein diet has shown to reduce headache frequency as well.  Instructed to keep headache journal.    - sumatriptan (IMITREX) 100 MG tablet; Take 1 tablet (100 mg total) by mouth 2 (two) times daily as needed for Migraine.  Dispense: 9 tablet; Refill: 4    6. Depression, major, recurrent, in partial remission  Following BH  Stable  Meds per BH    Practice deep breathing or abdominal breathing exercises when anxiety occurs.  Exercise daily. Get sunlight daily.  Avoid caffeine, alcohol and stimulants.  Practice positive phrases and repeat throughout the day, yoga, lavender scents or Chamomile tea will help anxiety.  Set healthy boundaries, avoid people and conversations that increase stress.  Reports any symptoms of suicidal or homicidal ideations immediately, if clinic is closed go to nearest emergency room.   - sumatriptan (IMITREX) 100 MG tablet; Take 1 tablet (100 mg total) by mouth 2 (two) times daily as needed for Migraine.  Dispense: 9 tablet; Refill: 4        Current Outpatient Medications   Medication Instructions    buPROPion (WELLBUTRIN XL) 150 mg, Oral    CASCARA SAGRADA ORAL 450 mg, Daily    FLUoxetine 40 mg, Oral, Daily    linaCLOtide (LINZESS) 145 mcg, Oral, Before breakfast    ondansetron (ZOFRAN ODT ORAL) Oral, prn    senna-docusate 8.6-50 mg (PERICOLACE) 8.6-50 mg per tablet  1 tablet, Oral    sumatriptan (IMITREX) 100 mg, Oral, 2 times daily PRN    triamcinolone acetonide 0.1% (KENALOG) 0.1 % ointment Topical (Top), 2 times daily       Orders Placed This Encounter   Procedures    Chlamydia/GC, PCR    CBC Auto Differential    Comprehensive Metabolic Panel    Lipid Panel    TSH    Urinalysis    Vitamin D    SYPHILIS ANTIBODY (WITH REFLEX RPR)    Hepatitis C Antibody    HIV 1/2 Ag/Ab (4th Gen)    HCG Qualitative Urine         Future Appointments   Date Time Provider Department Center   6/16/2025  8:00 AM Annemarie Fischer FNP [facility]        Follow up in about 1 year (around 6/12/2025) for wellness.    Labs thoroughly reviewed with patient. Medication refills addressed today.  RTC prn and 12 months, with labs 1 week prior to the apt.  COVID 19 precautions given to patient.  Patient voices understanding of all discharge instructions.      JHONATHAN Bowden

## 2024-06-12 NOTE — TELEPHONE ENCOUNTER
Please request pap smear and OV notes from [facility] on [address] from approx 11/2023. thanks

## 2024-06-14 ENCOUNTER — PATIENT MESSAGE (OUTPATIENT)
Dept: INTERNAL MEDICINE | Facility: CLINIC | Age: 24
End: 2024-06-14
Payer: COMMERCIAL

## 2024-12-12 ENCOUNTER — OFFICE VISIT (OUTPATIENT)
Dept: GYNECOLOGY | Facility: CLINIC | Age: 24
End: 2024-12-12
Payer: COMMERCIAL

## 2024-12-12 VITALS
WEIGHT: 167.38 LBS | BODY MASS INDEX: 28.57 KG/M2 | SYSTOLIC BLOOD PRESSURE: 122 MMHG | HEART RATE: 84 BPM | DIASTOLIC BLOOD PRESSURE: 78 MMHG | OXYGEN SATURATION: 100 % | RESPIRATION RATE: 18 BRPM | HEIGHT: 64 IN

## 2024-12-12 DIAGNOSIS — Z11.3 SCREENING FOR STD (SEXUALLY TRANSMITTED DISEASE): ICD-10-CM

## 2024-12-12 DIAGNOSIS — Z12.4 ENCOUNTER FOR PAPANICOLAOU SMEAR FOR CERVICAL CANCER SCREENING: Primary | ICD-10-CM

## 2024-12-12 LAB
B-HCG UR QL: NEGATIVE
C TRACH DNA SPEC QL NAA+PROBE: NOT DETECTED
CLUE CELLS VAG QL WET PREP: ABNORMAL
CTP QC/QA: YES
N GONORRHOEA DNA SPEC QL NAA+PROBE: NOT DETECTED
SOURCE (OHS): NORMAL
T VAGINALIS VAG QL WET PREP: ABNORMAL
WBC #/AREA VAG WET PREP: ABNORMAL
YEAST SPEC QL WET PREP: ABNORMAL

## 2024-12-12 PROCEDURE — 99214 OFFICE O/P EST MOD 30 MIN: CPT | Mod: PBBFAC

## 2024-12-12 PROCEDURE — 87210 SMEAR WET MOUNT SALINE/INK: CPT

## 2024-12-12 PROCEDURE — 87591 N.GONORRHOEAE DNA AMP PROB: CPT

## 2024-12-12 PROCEDURE — 81025 URINE PREGNANCY TEST: CPT | Mod: PBBFAC

## 2024-12-12 PROCEDURE — 88174 CYTOPATH C/V AUTO IN FLUID: CPT

## 2024-12-12 NOTE — PROGRESS NOTES
[facility] -  Gynecology / Women's Health Clinic     Subjective:      Patient ID: Patricia Guzman is a 24 y.o. female.    Chief Complaint:  Gynecologic Exam      History of Present Illness:  The patient G0 here for annual exam. Pt has Nexplanon which was inserted 2023 with [facility] in [place], admits amenorrhea. Denies history of abnormal paps. Denies breast or urinary complaints. Denies pelvic pain, abnormal bleeding or discharge. Pt reports no STIs in the past and desires testing. HPV vaccinated. Contraception consist of Nexplanon. Denies tobacco use. Dep. screening 0. Denies fly hx of ovarian or uterine cancer. PGM with breast cancer. Father with colon cancer at 49yrs. Hx of constipation, prescribed Linzess, admits not taking d/t cost, followed by IM/Neurology clinics.    GYN & OB History:  No LMP recorded (lmp unknown).     OB History    Para Term  AB Living   0 0 0 0 0 0   SAB IAB Ectopic Multiple Live Births   0 0 0 0         Past Medical History:   Diagnosis Date    Depression     Headache     History of psychiatric care     Hx of psychiatric care     Psychiatric exam requested by authority     Psychiatric problem     Therapy         Past Surgical History:   Procedure Laterality Date    COLONOSCOPY N/A 2015    Procedure: COLONOSCOPY;  Surgeon: Arleen áSnchez MD;  Location: [address]);  Service: Endoscopy;  Laterality: N/A;        Social History     Tobacco Use    Smoking status: Never    Smokeless tobacco: Never   Substance and Sexual Activity    Alcohol use: Yes     Alcohol/week: 2.0 - 3.0 standard drinks of alcohol     Types: 2 - 3 Glasses of wine per week     Comment: occassionally    Drug use: Not Currently     Types: Marijuana     Comment: past    Sexual activity: Yes        Current Outpatient Medications   Medication Instructions    buPROPion (WELLBUTRIN XL) 150 mg, Oral    CASCARA SAGRADA ORAL 450 mg, Daily    FLUoxetine 40 mg, Oral,  "Daily    linaCLOtide (LINZESS) 145 mcg, Oral, Before breakfast    ondansetron (ZOFRAN ODT ORAL) Take by mouth. prn    senna-docusate 8.6-50 mg (PERICOLACE) 8.6-50 mg per tablet 1 tablet    sumatriptan (IMITREX) 100 mg, Oral, 2 times daily PRN    triamcinolone acetonide 0.1% (KENALOG) 0.1 % ointment Topical (Top), 2 times daily       Review of patient's allergies indicates:  No Known Allergies      Review of Systems:  Review of Systems  Negative except for pertinent findings for positives per HPI.     Objective:     Physical Exam   Visit Vitals  /78 (Patient Position: Sitting)   Pulse 84   Resp 18   Ht 5' 4" (1.626 m)   Wt 75.9 kg (167 lb 6.4 oz)   LMP  (LMP Unknown)   SpO2 100%   BMI 28.73 kg/m²       GENERAL: Well-developed female. No acute distress.    SKIN: Normal to inspection, warm and intact.  BREASTS: No rashes or erythema. No masses, lumps, discharge, tenderness.  VULVA: General appearance normal; external genitalia with no lesions or erythema.  VAGINA: Mucosa/vaginal vault pink, no abnormal discharge or lesions.  CERVIX: Pink, nulliparous appearing os, no erythema or abnormal discharge.  BIMANUAL EXAM: reveals a 8 week-sized uterus. The uterus is non tender. Bilateral adnexa reveal no tenderness.  PSYCHIATRIC: Patient is oriented to person, place, and time. Mood and affect are normal.    Assessment:       ICD-10-CM ICD-9-CM   1. Encounter for Papanicolaou smear for cervical cancer screening  Z12.4 V76.2   2. Screening for STD (sexually transmitted disease)  Z11.3 V74.5       Plan:     1. Encounter for Papanicolaou smear for cervical cancer screening  -     Liquid-Based Pap Smear, Screening    2. Screening for STD (sexually transmitted disease)  -     Chlamydia/GC, PCR  -     Wet Prep, Genital  -     POCT urine pregnancy    Pap today  STD testing  UPT - neg    Call with any GYN concerns    Follow up in about 1 year (around 12/12/2025) for Annual.    "

## 2024-12-20 ENCOUNTER — TELEPHONE (OUTPATIENT)
Dept: GYNECOLOGY | Facility: CLINIC | Age: 24
End: 2024-12-20
Payer: COMMERCIAL

## 2024-12-23 ENCOUNTER — TELEPHONE (OUTPATIENT)
Dept: GYNECOLOGY | Facility: CLINIC | Age: 24
End: 2024-12-23
Payer: COMMERCIAL

## 2025-01-07 ENCOUNTER — TELEPHONE (OUTPATIENT)
Dept: GYNECOLOGY | Facility: CLINIC | Age: 25
End: 2025-01-07
Payer: COMMERCIAL

## 2025-01-07 NOTE — TELEPHONE ENCOUNTER
Attempted to call patient about pap smear results. Per ASCCP, recommendations to repeat pap smear in 1 yr at annual exam. LVM to call clinic, no other details given.

## 2025-01-08 ENCOUNTER — TELEPHONE (OUTPATIENT)
Dept: GYNECOLOGY | Facility: CLINIC | Age: 25
End: 2025-01-08
Payer: COMMERCIAL

## 2025-01-08 NOTE — TELEPHONE ENCOUNTER
verified. Pap smear results explained to patient. Per ASCCP guidelines, repeat pap smear in one year at annual exam. Immune health tips discussed. Pt states understanding, no further questions.

## 2025-06-05 ENCOUNTER — OFFICE VISIT (OUTPATIENT)
Dept: INTERNAL MEDICINE | Facility: CLINIC | Age: 25
End: 2025-06-05
Payer: COMMERCIAL

## 2025-06-05 VITALS
DIASTOLIC BLOOD PRESSURE: 76 MMHG | HEIGHT: 64 IN | HEART RATE: 92 BPM | OXYGEN SATURATION: 98 % | TEMPERATURE: 98 F | WEIGHT: 176.38 LBS | SYSTOLIC BLOOD PRESSURE: 113 MMHG | RESPIRATION RATE: 20 BRPM | BODY MASS INDEX: 30.11 KG/M2

## 2025-06-05 DIAGNOSIS — G43.001 MIGRAINE WITHOUT AURA AND WITH STATUS MIGRAINOSUS, NOT INTRACTABLE: ICD-10-CM

## 2025-06-05 DIAGNOSIS — F33.41 DEPRESSION, MAJOR, RECURRENT, IN PARTIAL REMISSION: ICD-10-CM

## 2025-06-05 DIAGNOSIS — K59.04 CHRONIC IDIOPATHIC CONSTIPATION: ICD-10-CM

## 2025-06-05 DIAGNOSIS — L40.9 PSORIASIS: ICD-10-CM

## 2025-06-05 DIAGNOSIS — Z11.3 SCREEN FOR STD (SEXUALLY TRANSMITTED DISEASE): ICD-10-CM

## 2025-06-05 DIAGNOSIS — Z00.00 WELLNESS EXAMINATION: ICD-10-CM

## 2025-06-05 DIAGNOSIS — R21 RASH: ICD-10-CM

## 2025-06-05 PROCEDURE — 99214 OFFICE O/P EST MOD 30 MIN: CPT | Mod: PBBFAC | Performed by: NURSE PRACTITIONER

## 2025-06-05 RX ORDER — SUMATRIPTAN SUCCINATE 100 MG/1
100 TABLET ORAL 2 TIMES DAILY PRN
Qty: 9 TABLET | Refills: 6 | Status: SHIPPED | OUTPATIENT
Start: 2025-06-05 | End: 2025-07-05

## 2025-06-05 RX ORDER — TRIAMCINOLONE ACETONIDE 1 MG/G
OINTMENT TOPICAL 2 TIMES DAILY
Qty: 30 G | Refills: 3 | Status: SHIPPED | OUTPATIENT
Start: 2025-06-05

## 2025-06-05 RX ORDER — ONDANSETRON 8 MG/1
8 TABLET, ORALLY DISINTEGRATING ORAL EVERY 12 HOURS PRN
Qty: 15 TABLET | Refills: 6 | Status: SHIPPED | OUTPATIENT
Start: 2025-06-05

## 2025-06-05 RX ORDER — METHYLPREDNISOLONE 4 MG/1
TABLET ORAL
Qty: 21 EACH | Refills: 0 | Status: SHIPPED | OUTPATIENT
Start: 2025-06-05